# Patient Record
Sex: FEMALE | Race: BLACK OR AFRICAN AMERICAN | Employment: UNEMPLOYED | URBAN - METROPOLITAN AREA
[De-identification: names, ages, dates, MRNs, and addresses within clinical notes are randomized per-mention and may not be internally consistent; named-entity substitution may affect disease eponyms.]

---

## 2017-11-03 ENCOUNTER — APPOINTMENT (OUTPATIENT)
Dept: GENERAL RADIOLOGY | Age: 27
End: 2017-11-03
Attending: PHYSICIAN ASSISTANT
Payer: COMMERCIAL

## 2017-11-03 ENCOUNTER — HOSPITAL ENCOUNTER (EMERGENCY)
Age: 27
Discharge: HOME | End: 2017-11-03
Attending: EMERGENCY MEDICINE
Payer: COMMERCIAL

## 2017-11-03 VITALS
BODY MASS INDEX: 39.99 KG/M2 | RESPIRATION RATE: 16 BRPM | SYSTOLIC BLOOD PRESSURE: 145 MMHG | WEIGHT: 240 LBS | HEART RATE: 79 BPM | OXYGEN SATURATION: 97 % | HEIGHT: 65 IN | DIASTOLIC BLOOD PRESSURE: 78 MMHG | TEMPERATURE: 97.9 F

## 2017-11-03 DIAGNOSIS — R29.898 ANKLE WEAKNESS: Primary | ICD-10-CM

## 2017-11-03 PROCEDURE — 99282 EMERGENCY DEPT VISIT SF MDM: CPT

## 2017-11-03 PROCEDURE — 73610 X-RAY EXAM OF ANKLE: CPT

## 2017-11-03 NOTE — ED PROVIDER NOTES
HPI Comments: 12:25 PM Presley May is a 28 y.o. female who presents to the ED c/o left ankle muscle weakness that began \"a couple of days ago. \" The patient states that she cannot walk on her ankle d/t it feeling weak. She reports left ORIF 4 years ago and did receive physical therapy afterwards, but does report walking with a limp since the injury. The patient denies any ankle pain. She also denies recent left ankle injury, falls, and additional complaints or concerns. The history is provided by the patient. Past Medical History:   Diagnosis Date    Asthma     Sleep apnea        Past Surgical History:   Procedure Laterality Date    HX ORTHOPAEDIC Left 2012    ankle    HX WISDOM TEETH EXTRACTION  2011         Family History:   Problem Relation Age of Onset    Diabetes Mother     Diabetes Father     Diabetes Maternal Grandmother        Social History     Social History    Marital status: SINGLE     Spouse name: N/A    Number of children: N/A    Years of education: N/A     Occupational History    Not on file. Social History Main Topics    Smoking status: Never Smoker    Smokeless tobacco: Never Used    Alcohol use Yes      Comment: OCCASIONALLY    Drug use: No    Sexual activity: Not on file     Other Topics Concern    Not on file     Social History Narrative    No narrative on file         ALLERGIES: Review of patient's allergies indicates no known allergies. Review of Systems   Constitutional: Negative for diaphoresis and fever. HENT: Negative for ear pain, rhinorrhea and trouble swallowing. Eyes: Negative for visual disturbance. Respiratory: Negative for cough and shortness of breath. Cardiovascular: Negative for chest pain and leg swelling. Gastrointestinal: Negative for abdominal pain, blood in stool, diarrhea, nausea and vomiting. Genitourinary: Negative for difficulty urinating, flank pain and hematuria.    Musculoskeletal: Negative for back pain and neck pain.   Skin: Negative for rash. Neurological: Positive for weakness (left ankle). Negative for dizziness, numbness and headaches. Hematological: Negative. Psychiatric/Behavioral: Negative. All other systems reviewed and are negative. Vitals:    11/03/17 1222   BP: 145/78   Pulse: 79   Resp: 16   Temp: 97.9 °F (36.6 °C)   SpO2: 97%   Weight: 108.9 kg (240 lb)   Height: 5' 5\" (1.651 m)            Physical Exam   Constitutional: She is oriented to person, place, and time. She appears well-developed and well-nourished. No distress. HENT:   Head: Normocephalic and atraumatic. Eyes: Conjunctivae are normal.   Neck: Normal range of motion. Neck supple. Cardiovascular: Normal rate, regular rhythm and normal heart sounds. Pulmonary/Chest: Effort normal and breath sounds normal. No respiratory distress. She has no wheezes. She has no rales. Musculoskeletal: Normal range of motion. Left ankle with well healed surgical scars. Flexion, extension, inversion and eversion tested, 5/5 through all planes of motion. Knee strength 5/5. Patellar reflex 2+. No swelling, erythema or calf tenderness. Neurological: She is alert and oriented to person, place, and time. Skin: Skin is warm and dry. Psychiatric: She has a normal mood and affect. Her behavior is normal. Judgment and thought content normal.   Nursing note and vitals reviewed. MDM  Number of Diagnoses or Management Options  Ankle weakness:     ED Course       SplintMuriel  Date/Time: 11/3/2017 1:01 PM  Performed by: Kym Pardees  Authorized by: Kym Paredes     Consent:     Consent obtained:  Verbal    Consent given by:  Patient    Risks discussed:  Numbness and pain    Alternatives discussed:  No treatment  Pre-procedure details:     Sensation:  Normal  Procedure details:     Laterality:  Left    Location:  Ankle    Ankle:  L ankle    Strapping: no      Splint type:   Ankle stirrup    Supplies:  Prefabricated splint  Post-procedure details:     Pain:  Improved    Sensation:  Normal    Patient tolerance of procedure: Tolerated well, no immediate complications        Vitals:  Patient Vitals for the past 12 hrs:   Temp Pulse Resp BP SpO2   11/03/17 1222 97.9 °F (36.6 °C) 79 16 145/78 97 %         Medications ordered:   Medications - No data to display        X-Ray, CT or other radiology findings or impressions:  XR ANKLE LT MIN 3 V   Final Result   IMPRESSION:   1. Open reduction internal fixation of the distal tibia and fibula  with   evidence of healing. 2.  No evidence of an acute fracture or dislocation. 3.  Soft tissue edema. 1:03 PM Pt well appearing and in NAD. Nothing acute on x-ray. No obvious weakness noted on exam. Will give ankle splint for support, refer to ortho for further eval and possible PT referral.     Disposition:  Diagnosis:   1. Ankle weakness        Disposition: Discharge     Follow-up Information     Follow up With Details Comments [address] [place] [address] [facility] [place] [phone] Call To make a follow up appointment [address] [facility], [address] [place] [id number] [facility] EMERGENCY DEPT  Immediately if symptoms worsen [address] [place] [id number]  [phone]           Patient's Medications   Start Taking    No medications on file   Continue Taking    ALBUTEROL (VENTOLIN HFA) 90 MCG/ACTUATION INHALER    Take 2 puffs by inhalation every four (4) hours as needed. ASPIRIN (ASPIRIN) 325 MG TABLET    Take 1 Tab by mouth daily. FLUTICASONE (FLONASE) 50 MCG/ACTUATION NASAL SPRAY    One spray per nostril nightly    GUAIFENESIN-CODEINE (CHERATUSSIN AC)  MG/5 ML SOLUTION    Take 5 mL by mouth four (4) times daily as needed for Cough.     METHYLPREDNISOLONE (MEDROL, TG,) 4 MG TABLET    Per dose pack instructions   These Medications have changed    No medications on file   Stop Taking    No medications on file         Tabatha Ayah acting as a scribe for and in the presence of Demetrio Velazquez      November 03, 2017 at 12:30 PM       Provider Attestation:      I personally performed the services described in the documentation, reviewed the documentation, as recorded by the scribe in my presence, and it accurately and completely records my words and actions.  November 03, 2017 at 12:30 PM - Rodney Arteaga PA-C

## 2017-11-03 NOTE — ED NOTES
I have reviewed discharge instructions with the patient. The patient verbalized understanding. Patient armband removed and shredded  Pt left ED in a WC, alert and in NAD.

## 2017-11-03 NOTE — DISCHARGE INSTRUCTIONS
Muscle Conditioning: Exercises  Your Care Instructions  Here are some examples of exercises for muscle conditioning. Start each exercise slowly. Ease off the exercise if you start to have pain. Your doctor or physical therapist will tell you when you can start these exercises and which ones will work best for you. How to do the exercises  Wall push-ups    When you can do this exercise against a wall comfortably (without your muscles feeling tired), you can try it against a counter. Start with 5 repetitions again and work up to 8 to 12. You can then slowly progress to the end of a couch or a sturdy chair, and finally to the floor. 1. Stand facing a wall, about 12 to 18 inches away. 2. Place your hands on the wall at shoulder height. 3. Slowly bend your elbows and bring your face toward the wall, moving your hips and shoulders forward together. 4. Push slowly back to the starting position. 5. Start with 5 repetitions and work up to 8 to 12. 6. Rest for a minute, and repeat the exercise. Knee extension    If this exercise becomes easy, you can add a light weight around your ankle or tie an elastic resistance band to a chair leg and one ankle. 1. While sitting in a chair, straighten one leg and hold while you slowly count to 5. Be sure you do not lock your knee. 2. Repeat 8 to 12 times. 3. Rest for a minute, and repeat the exercise. 4. Do the same exercise with the other leg. Side-lying leg lift    If this exercise becomes easy, you can add a light weight around your ankle or tie an elastic resistance band to both ankles. 1. Lie on your side, with your legs extended. Keep your hips straight up and down during this exercise. Do not let your top hip rock toward the back. Support your head with your hand, and place the other hand on the floor near your waist.  2. Slowly raise your upper leg until it is about in line with your shoulder. Keep your toes pointed forward.   3. Slowly lower your leg to the starting position. 4. Repeat 8 to 12 times. 5. Rest for a minute, and repeat the exercise. 6. Turn to your other side and do the same exercise with your other leg. Shallow standing knee bends    1. Stand with your hands lightly resting on a counter or chair in front of you with your feet shoulder-width apart. 2. Slowly bend your knees so that you squat down just like you were going to sit in a chair. Make sure your knees do not go in front of your toes. 3. Lower yourself about 6 inches. Your heels should remain on the floor at all times. 4. Rise slowly to a standing position. 5. Repeat 8 to 12 times. 6. Rest for a minute, and repeat the exercise. Follow-up care is a key part of your treatment and safety. Be sure to make and go to all appointments, and call your doctor if you are having problems. It's also a good idea to know your test results and keep a list of the medicines you take. Where can you learn more? Go to http://josh-rosmery.info/. Enter C063 in the search box to learn more about \"Muscle Conditioning: Exercises. \"  Current as of: March 13, 2017  Content Version: 11.4  © 2169-5736 Healthwise, [employer]. Care instructions adapted under license by [employer] (which disclaims liability or warranty for this information). If you have questions about a medical condition or this instruction, always ask your healthcare professional. [place] [address] any warranty or liability for your use of this information.

## 2017-11-13 ENCOUNTER — OFFICE VISIT (OUTPATIENT)
Dept: ORTHOPEDIC SURGERY | Age: 27
End: 2017-11-13

## 2017-11-13 VITALS
TEMPERATURE: 95.6 F | OXYGEN SATURATION: 98 % | DIASTOLIC BLOOD PRESSURE: 58 MMHG | WEIGHT: 240 LBS | BODY MASS INDEX: 39.99 KG/M2 | HEART RATE: 76 BPM | SYSTOLIC BLOOD PRESSURE: 101 MMHG | HEIGHT: 65 IN

## 2017-11-13 DIAGNOSIS — R29.898 WEAKNESS OF BOTH LOWER EXTREMITIES: Primary | ICD-10-CM

## 2017-11-13 DIAGNOSIS — M19.172 POST-TRAUMATIC OSTEOARTHRITIS OF LEFT ANKLE: ICD-10-CM

## 2017-11-13 NOTE — PROGRESS NOTES
AMBULATORY PROGRESS NOTE      Patient: Jermaine Bey             MRN: [id number]     SSN: [id number] Body mass index is 39.94 kg/(m^2). YOB: 1990     AGE: 28 y.o. EX: female    PCP: None    IMPRESSION/DIAGNOSIS AND TREATMENT PLAN     DIAGNOSES  1. Weakness of both lower extremities    2. Post-traumatic osteoarthritis of left ankle        Orders Placed This Encounter    AMB SUPPLY ORDER    NCV/LAT MOTOR PER NERVE LOW/LT    NCV/LAT MOTOR PER NERVE LOW/RT      Jermaine Bey understands her diagnoses and the proposed plan. Plan:    1) DME Order: Hinged AFO Brace  2) EMG/NCS Studies Bilateral    RTO - After EMG : need to assess her DPN. HPI AND EXAMINATION     Jermaine Bey IS A 28 y.o. female who presents to my outpatient office complaining of left ankle pain. The patient reports that she sprained her left ankle and had a surgery done by Dr. Rohini Knight. She notes pain is elicited after prolonged ambulation. Pt states that she says that she always had a limp while ambulating and can fall occasionally. Pt denies h/o CVA. Jermaine Bey is alert/oriented (name, location, time) and follows commands well. she  is in no acute distress and her affect and mood are appropriate. Left ANKLE and FOOT       Gait: slow  Tenderness: no TTP. Cutaneous: No rashes, skin patches, wounds, or abrasions to the lower legs           Warm and Normal color. No regions of expressible drainage. Medial Border of Tibia Region: absent           Skin color, texture, turgor normal. Normal.           Well healed lateral and medial surgical scars. Joint Motion: ROM Ankle:Decreased , Hindfoot: (ST,TN,CC Decreased}, Forefoot toes:Normal  Neurologic Exam: Neuro: Motor: weakness noted: Weakness to EHL, EDC. Weakness to Eversion, Dorsiflexion 2/5 and Sensory : no sensory deficits noted.     Contractures: Gastrocnemius or Achilles Contractures moderate  Joint / Tendon Stability: No Ankle or Subtalar instability or joint laxity. No peroneal sublux ability or dislocation  Alignment:  Normal Foot Alignment and  Flexible  Vascular: Normal Pulses/ NL Capillary refill, No evidence of DVT seen on physical exam.   No calf swelling, no tenderness to calf muscles. Lymphatic:  No Evidence of Lymphedema. CHART REVIEW     Past Medical History:   Diagnosis Date    Asthma     Sleep apnea      Current Outpatient Prescriptions   Medication Sig    albuterol (VENTOLIN HFA) 90 mcg/actuation inhaler Take 2 puffs by inhalation every four (4) hours as needed.  methylprednisolone (MEDROL, TG,) 4 mg tablet Per dose pack instructions    fluticasone (FLONASE) 50 mcg/actuation nasal spray One spray per nostril nightly    guaiFENesin-codeine (CHERATUSSIN AC)  mg/5 mL solution Take 5 mL by mouth four (4) times daily as needed for Cough.  aspirin (ASPIRIN) 325 mg tablet Take 1 Tab by mouth daily. No current facility-administered medications for this visit. No Known Allergies  Past Surgical History:   Procedure Laterality Date    HX ORTHOPAEDIC Left 2012    ankle    HX WISDOM TEETH EXTRACTION  2011     Social History     Occupational History    Not on file. Social History Main Topics    Smoking status: Never Smoker    Smokeless tobacco: Never Used    Alcohol use Yes      Comment: OCCASIONALLY    Drug use: No    Sexual activity: Not on file     Family History   Problem Relation Age of Onset    Diabetes Mother     Diabetes Father     Diabetes Maternal Grandmother        REVIEW OF SYSTEMS : 11/13/2017  ALL BELOW ARE Negative except : SEE HPI       Constitutional: Negative for fever, chills and weight loss. Neg Weigh Loss  Cardiovascular: Negative for chest pain, claudication and leg swelling.  SOB, KEN   Gastrointestinal: Negative for  pain, N/V/D/C, Blood in stool or urine,dysuria, hematuria,        Incontinence, pelvic pain  Musculoskeletal: see HPI.  Neurological: Negative for dizziness and weakness. Negative for headaches,Visual Changes, Confusion, Seizures,   Psychiatric/Behavioral: Negative for depression, memory loss and substance abuse. Extremities:  Negative for  hair changes, rash or skin lesion changes. Hematologic: Negative for Bleeding problems, bruising, pallor or swollen lymph nodes. Peripheral Vascular: No calf pain, vascular vein tenderness to calf pain              No calf throbbing, posterior knee throbbing pain    DIAGNOSTIC IMAGING     No notes on file    XR Results (most recent):    Results from Hospital Encounter encounter on 11/03/17   XR ANKLE LT MIN 3 V   Narrative EXAM: Left ankle X-Ray    INDICATION: Left ankle pain. TECHNIQUE: 3 views of the left ankle    COMPARISON: 12/14/2012 and 12/9/2012    FINDINGS:   Side plate with multiple screws are noted in the distal tibia and lateral  malleolus. No interval hardware complications appreciated. There is adequate  alignment and apposition of the fracture fragments. Changes consistent with  progression of healing are noted. No evidence of an acute fracture or  dislocation. Soft tissue edema is noted. Impression IMPRESSION:  1. Open reduction internal fixation of the distal tibia and fibula  with  evidence of healing. 2.  No evidence of an acute fracture or dislocation. 3.  Soft tissue edema. Written by Teresa Conway, as dictated by Emilie Scott MD. IDr., Emilie Scott MD, confirm that all documentation is accurate.

## 2017-11-13 NOTE — MR AVS SNAPSHOT
Visit Information Date & Time Provider Department Dept. Phone Encounter #  
 11/13/2017  3:30 PM Aurelio Zepeda, [address] [facility] [phone] Upcoming Health Maintenance Date Due DTaP/Tdap/Td series (1 - Tdap) 4/24/2010 PAP AKA CERVICAL CYTOLOGY 9/24/2015 Influenza Age 5 to Adult 8/1/2017 Allergies as of 11/13/2017  Review Complete On: 11/13/2017 By: Shlomo Duverney Strowder No Known Allergies Current Immunizations  Never Reviewed No immunizations on file. Not reviewed this visit You Were Diagnosed With   
  
 Codes Comments Weakness of both lower extremities    -  Primary ICD-10-CM: R29.898 ICD-9-CM: 729.89 Vitals BP Pulse Temp Height(growth percentile) Weight(growth percentile) SpO2  
 101/58 76 95.6 °F (35.3 °C) (Oral) 5' 5\" (1.651 m) 240 lb (108.9 kg) 98% BMI OB Status Smoking Status 39.94 kg/m2 Having regular periods Never Smoker BMI and BSA Data Body Mass Index Body Surface Area  
 39.94 kg/m 2 2.23 m 2 Your Updated Medication List  
  
   
This list is accurate as of: 11/13/17  5:28 PM.  Always use your most recent med list.  
  
  
  
  
 albuterol 90 mcg/actuation inhaler Commonly known as:  VENTOLIN HFA Take 2 puffs by inhalation every four (4) hours as needed. aspirin 325 mg tablet Commonly known as:  ASPIRIN Take 1 Tab by mouth daily. fluticasone 50 mcg/actuation nasal spray Commonly known as:  Neli Jumper One spray per nostril nightly  
  
 guaiFENesin-codeine 100-10 mg/5 mL solution Commonly known as:  Magnolia Jose Take 5 mL by mouth four (4) times daily as needed for Cough. methylPREDNISolone 4 mg tablet Commonly known as:  MEDROL (TG) Per dose pack instructions We Performed the Following AMB SUPPLY ORDER [[id number] Custom] Comments:  
 Hinged AFO brace for left side To-Do List   
 11/13/2017 Neurology:  NCV/LAT MOTOR PER NERVE LOW/LT   
  
 11/13/2017 Neurology:  NCV/LAT MOTOR PER NERVE LOW/RT Referral Information Referral ID Referred By Referred To  
  
 [id number] Amalia Ruelas Not Available Visits Status Start Date End Date 1 New Request 11/13/17 11/13/18 If your referral has a status of pending review or denied, additional information will be sent to support the outcome of this decision. Referral ID Referred By Referred To  
 [id number] Amalia Ruelas Not Available Visits Status Start Date End Date 1 New Request 11/13/17 11/13/18 If your referral has a status of pending review or denied, additional information will be sent to support the outcome of this decision. Patient Instructions Please follow up after EMG. You are advised to contact us if your condition worsens. Foot Pain: Care Instructions Your Care Instructions Foot injuries that cause pain and swelling are fairly common. Almost all sports or home repair projects can cause a misstep that ends up as foot pain. Normal wear and tear, especially as you get older, also can cause foot pain. Most minor foot injuries will heal on their own, and home treatment is usually all you need to do. If you have a severe injury, you may need tests and treatment. Follow-up care is a key part of your treatment and safety. Be sure to make and go to all appointments, and call your doctor if you are having problems. It's also a good idea to know your test results and keep a list of the medicines you take. How can you care for yourself at home? · Take pain medicines exactly as directed. ¨ If the doctor gave you a prescription medicine for pain, take it as prescribed. ¨ If you are not taking a prescription pain medicine, ask your doctor if you can take an over-the-counter medicine. · Rest and protect your foot. Take a break from any activity that may cause pain. · Put ice or a cold pack on your foot for 10 to 20 minutes at a time. Put a thin cloth between the ice and your skin. · Prop up the sore foot on a pillow when you ice it or anytime you sit or lie down during the next 3 days. Try to keep it above the level of your heart. This will help reduce swelling. · Your doctor may recommend that you wrap your foot with an elastic bandage. Keep your foot wrapped for as long as your doctor advises. · If your doctor recommends crutches, use them as directed. · Wear roomy footwear. · As soon as pain and swelling end, begin gentle exercises of your foot. Your doctor can tell you which exercises will help. When should you call for help? Call 911 anytime you think you may need emergency care. For example, call if: 
? · Your foot turns pale, white, blue, or cold. ?Call your doctor now or seek immediate medical care if: 
? · You cannot move or stand on your foot. ? · Your foot looks twisted or out of its normal position. ? · Your foot is not stable when you step down. ? · You have signs of infection, such as: 
¨ Increased pain, swelling, warmth, or redness. ¨ Red streaks leading from the sore area. ¨ Pus draining from a place on your foot. ¨ A fever. ? · Your foot is numb or tingly. ? Watch closely for changes in your health, and be sure to contact your doctor if: 
? · You do not get better as expected. ? · You have bruises from an injury that last longer than 2 weeks. Where can you learn more? Go to http://josh-rosmery.info/. Enter Q053 in the search box to learn more about \"Foot Pain: Care Instructions. \" Current as of: March 21, 2017 Content Version: 11.4 © 8036-6400 F[employer]. Care instructions adapted under license by [employer] (which disclaims liability or warranty for this information).  If you have questions about a medical condition or this instruction, always ask your healthcare professional. [address]  any warranty or liability for your use of this information. Introducing [facility]! [facility] introduces OP3Nvoice patient portal. Now you can access parts of your medical record, email your doctor's office, and request medication refills online. 1. In your internet browser, go to [email or web address] 2. Click on the First Time User? Click Here link in the Sign In box. You will see the New Member Sign Up page. 3. Enter your OP3Nvoice Access Code exactly as it appears below. You will not need to use this code after youve completed the sign-up process. If you do not sign up before the expiration date, you must request a new code. · OP3Nvoice Access Code: [email or web address] Expires: 12/31/2017  6:16 PM 
 
4. Enter the last four digits of your Social Security Number (xxxx) and Date of Birth (mm/dd/yyyy) as indicated and click Submit. You will be taken to the next sign-up page. 5. Create a OP3Nvoice ID. This will be your OP3Nvoice login ID and cannot be changed, so think of one that is secure and easy to remember. 6. Create a OP3Nvoice password. You can change your password at any time. 7. Enter your Password Reset Question and Answer. This can be used at a later time if you forget your password. 8. Enter your e-mail address. You will receive e-mail notification when new information is available in [address]. 9. Click Sign Up. You can now view and download portions of your medical record. 10. Click the Download Summary menu link to download a portable copy of your medical information. If you have questions, please visit the Frequently Asked Questions section of the OP3Nvoice website. Remember, OP3Nvoice is NOT to be used for urgent needs. For medical emergencies, dial 911. Now available from your iPhone and Android! Please provide this summary of care documentation to your next provider. Your primary care clinician is listed as NONE. If you have any questions after today's visit, please call [phone].

## 2017-11-15 ENCOUNTER — DOCUMENTATION ONLY (OUTPATIENT)
Dept: ORTHOPEDIC SURGERY | Age: 27
End: 2017-11-15

## 2017-12-06 ENCOUNTER — TELEPHONE (OUTPATIENT)
Dept: ORTHOPEDIC SURGERY | Age: 27
End: 2017-12-06

## 2017-12-06 NOTE — TELEPHONE ENCOUNTER
PATIENT IS CHECKING THE STATUS OF HER EMG THAT WAS ORDERED AT HER VISIT ON 11/13/17.     SHE LIVES IN [place] AND WOULD LIKE SOMETHING IN CLOSE TO HER

## 2017-12-07 NOTE — TELEPHONE ENCOUNTER
Patient called again regarding this. She also says Dr. Gilford Jesus referred her to another doctor in neurology and she is wondering who that doctor is. Please advise patient at [phone].

## 2017-12-08 NOTE — TELEPHONE ENCOUNTER
Tried to call patient & let her know that her emg order was faxed to [facility] & I was going to give her the phone number to call & schedule [phone] but her LM was full

## 2017-12-11 NOTE — TELEPHONE ENCOUNTER
Patient called back because she missed a call from us and she had been calling checking on the EMG that Dr. Lan Anaya ordered. I relayed the below message to her and gave her [facility]'s number as listed. Patient verbalized understanding.

## 2018-01-24 ENCOUNTER — HOSPITAL ENCOUNTER (EMERGENCY)
Age: 28
Discharge: HOME | End: 2018-01-24
Attending: EMERGENCY MEDICINE
Payer: COMMERCIAL

## 2018-01-24 VITALS
DIASTOLIC BLOOD PRESSURE: 67 MMHG | SYSTOLIC BLOOD PRESSURE: 104 MMHG | RESPIRATION RATE: 18 BRPM | HEART RATE: 69 BPM | BODY MASS INDEX: 38.94 KG/M2 | TEMPERATURE: 97.5 F | OXYGEN SATURATION: 100 % | WEIGHT: 234 LBS
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
## 2018-01-24 PROCEDURE — 74011250636 HC RX REV CODE- 250/636: Performed by: EMERGENCY MEDICINE

## 2018-01-24 PROCEDURE — 74011250637 HC RX REV CODE- 250/637: Performed by: EMERGENCY MEDICINE

## 2018-01-24 PROCEDURE — 99284 EMERGENCY DEPT VISIT MOD MDM: CPT

## 2018-01-24 PROCEDURE — 96372 THER/PROPH/DIAG INJ SC/IM: CPT

## 2018-01-24 RX ORDER — KETOROLAC TROMETHAMINE 30 MG/ML
60 INJECTION, SOLUTION INTRAMUSCULAR; INTRAVENOUS
Status: COMPLETED | OUTPATIENT
Start: 2018-01-24 | End: 2018-01-24

## 2018-01-24 RX ORDER — CYCLOBENZAPRINE HCL 5 MG
10 TABLET ORAL
Qty: 9 TAB | Refills: 0 | Status: SHIPPED | OUTPATIENT
Start: 2018-01-24

## 2018-01-24 RX ORDER — NAPROXEN 500 MG/1
500 TABLET ORAL 2 TIMES DAILY WITH MEALS
Qty: 20 TAB | Refills: 0 | Status: SHIPPED | OUTPATIENT
Start: 2018-01-24

## 2018-01-24 RX ORDER — CYCLOBENZAPRINE HCL 10 MG
10 TABLET ORAL
Status: COMPLETED | OUTPATIENT
Start: 2018-01-24 | End: 2018-01-24

## 2018-01-24 RX ADMIN — KETOROLAC TROMETHAMINE 60 MG: 30 INJECTION, SOLUTION INTRAMUSCULAR at 09:29

## 2018-01-24 RX ADMIN — CYCLOBENZAPRINE HYDROCHLORIDE 10 MG: 10 TABLET, FILM COATED ORAL at 09:29
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
## 2018-02-01 ENCOUNTER — HOSPITAL ENCOUNTER (OUTPATIENT)
Dept: MRI IMAGING | Age: 28
Discharge: HOME | End: 2018-02-01
Attending: PSYCHIATRY & NEUROLOGY
Payer: COMMERCIAL

## 2018-02-01 DIAGNOSIS — G35 MULTIPLE SCLEROSIS (HCC): ICD-10-CM

## 2018-02-01 PROCEDURE — 70551 MRI BRAIN STEM W/O DYE: CPT

## 2018-02-02 ENCOUNTER — HOSPITAL ENCOUNTER (OUTPATIENT)
Dept: LAB | Age: 28
Discharge: HOME | End: 2018-02-02

## 2018-02-02 LAB — SENTARA SPECIMEN COL,SENBCF: NORMAL

## 2018-02-02 PROCEDURE — 99001 SPECIMEN HANDLING PT-LAB: CPT | Performed by: PSYCHIATRY & NEUROLOGY

## 2018-03-07 ENCOUNTER — HOSPITAL ENCOUNTER (OUTPATIENT)
Dept: MRI IMAGING | Age: 28
Discharge: HOME | End: 2018-03-07
Attending: PSYCHIATRY & NEUROLOGY
Payer: COMMERCIAL

## 2018-03-07 DIAGNOSIS — G35 MULTIPLE SCLEROSIS (HCC): ICD-10-CM

## 2018-03-07 PROCEDURE — 72146 MRI CHEST SPINE W/O DYE: CPT

## 2018-03-07 PROCEDURE — 72141 MRI NECK SPINE W/O DYE: CPT

## 2019-07-26 ENCOUNTER — HOSPITAL ENCOUNTER (OUTPATIENT)
Dept: LAB | Age: 29
Discharge: HOME | End: 2019-07-26

## 2019-07-26 LAB — SENTARA SPECIMEN COL,SENBCF: NORMAL

## 2019-07-26 PROCEDURE — 99001 SPECIMEN HANDLING PT-LAB: CPT

## 2019-09-18 ENCOUNTER — HOSPITAL ENCOUNTER (OUTPATIENT)
Dept: PHYSICAL THERAPY | Age: 29
Discharge: HOME | End: 2019-09-18
Payer: MEDICAID

## 2019-09-18 PROCEDURE — 97162 PT EVAL MOD COMPLEX 30 MIN: CPT

## 2019-09-18 PROCEDURE — 97166 OT EVAL MOD COMPLEX 45 MIN: CPT

## 2019-09-18 PROCEDURE — 97530 THERAPEUTIC ACTIVITIES: CPT
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
## 2019-09-18 NOTE — PROGRESS NOTES
[facility] - [facility]  [address] [facility]  [phone] fax    Plan of Care/ Statement of Necessity for Physical Therapy Services    Patient name: Senia Bustillos Start of Care: 2019   Referral source: Garo Conway NP : 1990    Medical Diagnosis: Multiple sclerosis [G35]  Payor: Ben Kemp / Plan: [id number] myContactCard / Product Type: Managed Care Medicaid /  Onset Date:2018    Treatment Diagnosis: gait and balance impairment   Prior Hospitalization: see medical history Provider#: [id number]   Medications: Verified on Patient summary List    Comorbidities: MS, asthma    Prior Level of Function: functionally independent,      The Plan of Care and following information is based on the information from the initial evaluation. Assessment/ key information: Patient is a 26year-old female with MS who presents with decreased strength, impaired balance, and difficulty with ambulation. Pt was diagnosed with MS in 2018; she had an MRI about 2 months ago and was given news that the disease progressed. She was using a cane initially, but is now using RW. Pt has a shower chair and raised toilet seat, which has improved her independence/safety with self-care tasks. She reports increased fatigue, can stand for up to 10 minutes at a time, for example when cooking. Pt completes TUG in 34 seconds. Pt has poor static standing balance and posterior lean/poor eccentric control with sit to stand transfers. Patient will benefit from skilled PT to address strength, ROM, balance, and gait to increase ease/safety of ADL's.      Evaluation Complexity History MEDIUM  Complexity : 1-2 comorbidities / personal factors will impact the outcome/ POC ; Examination MEDIUM Complexity : 3 Standardized tests and measures addressing body structure, function, activity limitation and / or participation in recreation  ;Presentation MEDIUM Complexity : Evolving with changing characteristics  ; Clinical Decision Making MEDIUM Complexity : FOTO score of 26-74  Overall Complexity Rating: MEDIUM  Problem List: decrease ROM, decrease strength, impaired gait/ balance, decrease ADL/ functional abilitiies, decrease activity tolerance, decrease flexibility/ joint mobility and decrease transfer abilities   Treatment Plan may include any combination of the following: Therapeutic exercise, Therapeutic activities, Neuromuscular re-education, Physical agent/modality, Gait/balance training, Manual therapy, Patient education, Functional mobility training, Home safety training and Stair training  Patient / Family readiness to learn indicated by: trying to perform skills  Persons(s) to be included in education: patient (P)  Barriers to Learning/Limitations: yes;  sensory deficits-vision/hearing/speech  Patient Goal (s): strengthen  Patient Self Reported Health Status: poor  Rehabilitation Potential: fair to good    Short Term Goals: To be accomplished in 1 weeks:  1. Patient will be compliant with HEP to strength and stability in order to increase ease of transfers. Long Term Goals: To be accomplished in 6 weeks:  1. Patient will complete TUG test in 25 seconds or less to increase ease of household ambulation. 2. Patient will maintain Romberg for 30 seconds without evidence of instability to improve ease of daily activities. 3. Patient will increase FOTO score by 17 pts indicating significant improvement in function and QOL. 4. Patient will perform 5 sit to stands without UE support to demonstrate improved LE stability for ease of transfers. Frequency / Duration: Patient to be seen 2-3 times per week for 6 weeks.     Patient/ CarPatient/ Caregiver education and instruction: Diagnosis, prognosis, exercises   [x]  Plan of care has been reviewed with PTA    Marie Cooper, PT 9/18/2019 10:57 AM    ________________________________________________________________________    I certify that the above Therapy Services are being furnished while the patient is under my care. I agree with the treatment plan and certify that this therapy is necessary.     Physician's Signature:____________Date:_________TIME:________    ** Signature, Date and Time must be completed for valid certification **    Please sign and return to [facility] - [facility]  [address] [facility]  [phone] fax

## 2019-09-18 NOTE — PROGRESS NOTES
PT DAILY TREATMENT NOTE/NEURO EVAL 10-18    Patient Name: Maritza Courser  Date:2019  : 1990  [x]  Patient  Verified  Payor: Lester Loredo / [place] / Product Type: Managed Care Medicaid /    In time:1000  Out time:1044  Total Treatment Time (min): 44  Visit #: 1   Treatment Area: Multiple sclerosis [G35]    SUBJECTIVE  Pain Level (0-10 scale): 0/10  []constant []intermittent []improving []worsening []no change since onset    Any medication changes, allergies to medications, adverse drug reactions, diagnosis change, or new procedure performed?: [x] No    [] Yes (see summary sheet for update)  Subjective functional status/changes:     Patient is a 26year-old female with MS who presents with decreased strength, impaired balance, and difficulty with ambulation. Pt was diagnosed with MS in 2018; she had an MRI about 2 months ago and was given news that the disease progressed. She was using a cane initially, but is now using RW. Pt has a shower chair and raised toilet seat, which has improved her independence/safety with self-care tasks. She reports increased fatigue, can stand for up to 10 minutes at a time, for example when cooking  Living Situation: lives with 8year old daughter, mother around the corner  Pt Goals: \" strengthen\"      OBJECTIVE/EXAMINATION        26 min [x]Eval                  []Re-Eval       18 min Therapeutic Activity:  []  See flow sheet : educated on home safety to reduce falls risk, educated on sit to stand transfer safety, TUG in 34 seconds, 5 times sit to stand with CGA using UE on legs   Rationale: increase ROM, increase strength, improve coordination and improve balance  to improve the patients ability to increase ease of ADL's.                With   [] TE   [] TA   [] neuro   [] other: Patient Education: [x] Review HEP    [] Progressed/Changed HEP based on:   [] positioning   [] body mechanics   [] transfers   [] heat/ice application [] other:      Other Objective/Functional Measures:     Physical Therapy Evaluation - Neurologic      Gait: [] Normal    [x] Abnormal    Device: RW    Describe: flat foot IC decreased push off, slow gissell      Strength (MMT): left : 66.6 right :58.3   Shoulder L (1-5) R (1-5)   Shoulder Flexion 4+/5 4+/5   Shoulder Ext     Shoulder ABD     Shoulder ADD     Shoulder IR     Shoulder ER                                               Hip L (1-5) R (1-5)   Hip Flexion 4-/5 4/5   Hip Ext     Hip ABD     Hip ADD     Hip ER 3+/5 3+/5   Hip IR 3+/5 3+/5     Knee L (1-5) R (1-5)   Knee Flexion 4-/5 4-/5   Knee Extension 4/5 4/5   Ankle PF     Ankle DF 4+/5 5+/5   Other             Reflexes: [] Not Tested   Left Right   Biceps (C5)     Brachioradiais (C6)     Triceps (C7)     Knee Jerk (L4) 2+ 1+   Ankle Jerk (SI)       Balance/ Equilibrium:              Standing Balance: Static:   [] Good    [] Fair    [x] Poor     Dynamic:   [] Good    [] Fair    [x] Poor        Protective Extension:  [] Present    [x] Delayed    [] Absent        Single Leg Stance:         Eyes Open  Eyes Closed   L unable L    R unable R      + Romberg with LOB      Other test /comments:  5 time sit to stand: 29 seconds  TU seconds  Negotiates stairs with B handrails at home       Pain Level (0-10 scale) post treatment: 0/10    ASSESSMENT/Changes in Function: See plan of care. Patient will continue to benefit from skilled PT services to modify and progress therapeutic interventions, address functional mobility deficits, address ROM deficits, address strength deficits, analyze and address soft tissue restrictions, analyze and cue movement patterns, address imbalance/dizziness and instruct in home and community integration to attain remaining goals.      [x]  See Plan of Care  []  See progress note/recertification  []  See Discharge Summary         Progress towards goals / Updated goals:  See POC    PLAN  []  Upgrade activities as tolerated [x]  Continue plan of care  []  Update interventions per flow sheet       []  Discharge due to:_  []  Other:_      Aguilar Figueredo, PT 9/18/2019  10:04 AM

## 2019-10-11 ENCOUNTER — APPOINTMENT (OUTPATIENT)
Dept: PHYSICAL THERAPY | Age: 29
End: 2019-10-11
Payer: MEDICAID

## 2019-10-11 ENCOUNTER — HOSPITAL ENCOUNTER (OUTPATIENT)
Dept: PHYSICAL THERAPY | Age: 29
End: 2019-10-11
Payer: MEDICAID

## 2019-10-18 ENCOUNTER — HOSPITAL ENCOUNTER (OUTPATIENT)
Dept: PHYSICAL THERAPY | Age: 29
Discharge: HOME | End: 2019-10-18
Payer: MEDICAID

## 2019-10-18 PROCEDURE — 97110 THERAPEUTIC EXERCISES: CPT

## 2019-10-18 PROCEDURE — 97530 THERAPEUTIC ACTIVITIES: CPT

## 2019-10-18 PROCEDURE — 97112 NEUROMUSCULAR REEDUCATION: CPT

## 2019-10-18 NOTE — PROGRESS NOTES
OT DAILY TREATMENT NOTE 10-18    Patient Name: Mg Ruiz  Date:10/18/2019  : 1990  [x]  Patient  Verified  Payor: Dot Carrillo / Plan: [facility] MEDICAID / Product Type: Managed Care Medicaid /    In time:7:40  Out time:8:15  Total Treatment Time (min): 35  Visit #: 2 of 12    Treatment Area: Lack of coordination [R27.9]  Multiple sclerosis [G35]    SUBJECTIVE  Pain Level (0-10 scale): 0/10  Any medication changes, allergies to medications, adverse drug reactions, diagnosis change, or new procedure performed?: [x] No    [] Yes (see summary sheet for update)  Subjective functional status/changes:   [] No changes reported  Having trouble with hands when helping daughter with homework    OBJECTIVE    First treatment session since initial eval, see updated goals. 35 min Therapeutic Activity:  []  See flow sheet :   Rationale: improve coordination and increase proprioception  to improve the patients ability to write. Theraputty soft, right hand 10/10  1/4 in pegs for FM coordination and palm to digit/digit to palm translation 35 x  Visual memory worksheet using pen to write, left hand.  6/7 correct     With   [] TE   [x] TA   [] neuro   [] other: Patient Education: [x] Review HEP     [] Progressed/Changed HEP based on:   [] positioning   [] body mechanics   [] transfers   [] heat/ice application   [] Splint wear/care   [] Sensory re-education   [] scar management      [x] other: Reviewed short-term memory strategies - taking notes while doing multiple tasks at home           Other Objective/Functional Measures:   6/7 questions correct during visual memory activity    Pain Level (0-10 scale) post treatment: 0/10     ASSESSMENT/Changes in Function: Patient improving focus during divided attention activity     Patient will continue to benefit from skilled OT services to modify and progress therapeutic interventions, address ROM deficits, analyze and address soft tissue restrictions, analyze and cue movement patterns and instruct in home and community integration to attain remaining goals. [x]  See Plan of Care  []  See progress note/recertification  []  See Discharge Summary         Progress towards goals / Updated goals:  Short Term Goals: To be accomplished in 2 weeks:  1. Patient will be independent in HEP for fine motor skills ot improve writing. 2.  Patient will be familiar with adaptive strategies to remember tasks to be performed. Status at progress note: Started with discussing using notes at home 10/18/19  3. Patient will be able to accurately attend to divided attention tasks without loss of focus for 15 minutes. Status at progress note: progressing with visual memory tasks 10/18/19     Long Term Goals: To be accomplished in 4 weeks:          1. Patient will be able to manage personal and family obligations without reminders form others. Status at progress note: Progressing with recall strategies 10/18/19  2. Patient will use strategies to improve recalll of informations without cueing  Status at progress note: progressing with visual memory tasks 10/18/19  3.   Patient will increase fine motor skills in both  hand by 30% to ease fasteners  Status at progress note: Progressing with fine motor coordination tasks 10/18/19       PLAN  []  Upgrade activities as tolerated     [x]  Continue plan of care  []  Update interventions per flow sheet       []  Discharge due to:_  []  Other:_      Dianne Dakins, OTS 10/18/2019  7:40 AM    Future Appointments   Date Time Provider Brady Bates   10/18/2019  8:30 AM Cali Lobo PTA [facility] [address]   10/21/2019  4:00 PM Barbie Burnham PTA [facility] [address]   10/21/2019  4:45 PM Kacy Mcdaniel EUYSOKN [address]   10/24/2019  3:00 PM Melly Bobby, PT [id number] [address]   10/24/2019  3:30 PM Fabiana Pizarro OTR/L [facility] [address]   10/29/2019  3:00 PM Melly Bobby, PT HAYAHYW [address]   10/29/2019  3:30 PM Kacy Mcdaniel ADXNURJ [address]   10/31/2019 11:30 AM Viktoria Balderas, MARI [facility]   10/31/2019 12:00 PM Gabbie Bills, OTR/L [facility]

## 2019-10-18 NOTE — PROGRESS NOTES
PT DAILY TREATMENT NOTE 10-18    Patient Name: Domingo Bill  Date:10/18/2019  : 1990  [x]  Patient  Verified  Payor: Felicitas Matt / [place] / Product Type: Managed Care Medicaid /    In time:8:23  Out time:8:58  Total Treatment Time (min): 35  Visit #: 2 of       Treatment Area: Gait instability [R26.81]  Balance disorder [R26.89]    SUBJECTIVE  Pain Level (0-10 scale): 0  Any medication changes, allergies to medications, adverse drug reactions, diagnosis change, or new procedure performed?: [x] No    [] Yes (see summary sheet for update)  Subjective functional status/changes:   [] No changes reported  Pt reports feeling fine. States not being able to come since Eval on 19 due to scheduling difficulties. OBJECTIVE    25 min Therapeutic Exercise:  [x] See flow sheet :   Rationale: increase ROM and increase strength to improve the patients ability to perform ADLs    10 min Neuromuscular Re-education:  [x]  See flow sheet :   Rationale: increase strength, improve coordination, improve balance and increase proprioception  to improve the patients ability to perform functional tasks         With   [] TE   [] TA   [] neuro   [] other: Patient Education: [x] Review HEP    [] Progressed/Changed HEP based on:   [] positioning   [] body mechanics   [] transfers   [] heat/ice application    [] other:      Other Objective/Functional Measures:   First f/u after Eval  Right foot strap with bike  TUG 35 sec  FOTO 34  Sit to stand x 5 38 seconds with david UE surrport  Rhomb at least 1 UE support     Pain Level (0-10 scale) post treatment: 0    ASSESSMENT/Changes in Function: Pt has had 1 month lapse in treatment following Eval on 19 due to scheduling difficulties. Pt reports compliance with HEP and is now able to attend PT regularly. Cont progressing towards goals set at Eval to improve ease with transfers, activity tolerance, improve safety with amb.      Patient will continue to benefit from skilled PT services to modify and progress therapeutic interventions, address functional mobility deficits, address ROM deficits, address strength deficits, analyze and address soft tissue restrictions, analyze and cue movement patterns, analyze and modify body mechanics/ergonomics and address imbalance/dizziness to attain remaining goals. []  See Plan of Care  []  See progress note/recertification  []  See Discharge Summary         Progress towards goals / Updated goals:  Short Term Goals: To be accomplished in 1 weeks:  1. Patient will be compliant with HEP to strength and stability in order to increase ease of transfers. Met- pt reports compliance 10/18/19  Long Term Goals: To be accomplished in 6 weeks:  1. Patient will complete TUG test in 25 seconds or less to increase ease of household ambulation. Not met- TUG 35 sec 10/18/19  2. Patient will maintain Romberg for 30 seconds without evidence of instability to improve ease of daily activities. Progressing- Performed Rhomb with 1 UE support and instable 10/18/19  3. Patient will increase FOTO score by 17 pts indicating significant improvement in function and QOL. Progressing- FOTO 34 10/18/19  4. Patient will perform 5 sit to stands without UE support to demonstrate improved LE stability for ease of transfers.     Progressing- Performed sit to stand x 5 with bilateral UE support, poor ecc control in 38 seconds    PLAN  []  Upgrade activities as tolerated     [x]  Continue plan of care  []  Update interventions per flow sheet       []  Discharge due to:_  []  Other:_      Stephane Bob, MARI 10/18/2019  8:14 AM    Future Appointments   Date Time Provider Brady Bates   10/18/2019  8:30 AM Wilfred Romo, PTA [facility]   10/21/2019  4:00 PM Lorena Ames, PTA [facility]   10/21/2019  4:45 PM Jose Martin Sims [id number] [facility]   10/24/2019  3:00 PM Nanci Cantu, PT [id number] [facility]   10/24/2019  3:30 PM Kimmy Jackson, OTR/L [facility] 10/29/2019  3:00 PM Travis Holman, PT [id number] [facility]   10/29/2019  3:30 PM Al DUMONT [facility]   10/31/2019 11:30 AM Shad Iraheta, PTA [facility]   10/31/2019 12:00 PM Macy Ramirez OTR/L [facility]

## 2019-10-18 NOTE — PROGRESS NOTES
[facility] - Renate Jenkins  [address] [facility]  [phone] fax    Physical Therapy Progress Note  Patient name: Krystina Quijano Start of Care: 19   Referral source: Pipo Tian NP : 1990   Medical/Treatment Diagnosis: Gait instability [R26.81]  Balance disorder [R26.89]  Payor: Niru Esqueda / Plan: Jacquelin Lennox / Product Type: Managed Care Medicaid /  Onset Date:1990     Prior Hospitalization: see medical history Provider#: [id number]   Medications: Verified on Patient Summary List    Comorbidities: MS, asthma   Prior Level of Function:functionally independent,    Visits from Start of Care: 2    Missed Visits: 0    Established Goals:         Excellent           Good         Limited           None  [] Increased ROM   []  []  [x]  []  [] Increased Strength  []  []  [x]  []  [] Increased Mobility  []  []  [x]  []   [] Decreased Pain   []  []  []  []  [] Decreased Swelling  []  []  []  []    Key Functional Changes:     Short Term Goals: To be accomplished in 1 weeks:  1. Patient will be compliant with HEP to strength and stability in order to increase ease of transfers. Met- pt reports compliance 10/18/19  Long Term Goals: To be accomplished in 6 weeks:  1. Patient will complete TUG test in 25 seconds or less to increase ease of household ambulation. Not met- TUG 35 sec 10/18/19  2. Patient will maintain Romberg for 30 seconds without evidence of instability to improve ease of daily activities. Progressing- Performed Rhomb with 1 UE support and ucdptwb41/18/19  3. Patient will increase FOTO score by 17 pts indicating significant improvement in function and QOL. Progressing- FOTO 34 10/18/19  4.  Patient will perform 5 sit to stands without UE support to demonstrate improved LE stability for ease of transfers.    Progressing- Performed sit to stand x 5 with bilateral UE support, poor ecc control in 38 seconds    Updated Goals: to be achieved in 6 weeks:   Cont with goals set at Eval    ASSESSMENT/RECOMMENDATIONS: Pt has had 1 month lapse in treatment following Eval on 9/18/19 due to scheduling difficulties. Pt reports compliance with HEP and is now able to attend PT regularly. Cont progressing towards goals set at Eval to improve ease with transfers, activity tolerance, improve safety with amb. [x]Continue therapy per initial plan/protocol at a frequency of  2-3 x per week for 6 weeks  []Continue therapy with the following recommended changes:_____________________      _____________________________________________________________________  []Discontinue therapy progressing towards or have reached established goals  []Discontinue therapy due to lack of appreciable progress towards goals  []Discontinue therapy due to lack of attendance or compliance  []Await Physician's recommendations/decisions regarding therapy  []Other:________________________________________________________________    Thank you for this referral.   Beto Cook, PTA 10/18/2019 9:02 AM    NOTE TO PHYSICIAN:  PLEASE COMPLETE THE ORDERS BELOW AND   FAX TO [facility]: [phone]  If you are unable to process this request in 24 hours please contact our office: [phone] I have read the above report and request that my patient continue as recommended. ? I have read the above report and request that my patient continue therapy with the following changes/special instructions:____________________________________  ? I have read the above report and request that my patient be discharged from therapy.     Physicians signature: ______________________________Date: ______Time:______

## 2019-10-18 NOTE — PROGRESS NOTES
[facility] - Renate Jenkins  [address] [facility]  [phone] fax    Occupational Therapy Progress Note  Patient name: Krystina Quijano Start of Care: 19   Referral source: Pipo Tian NP : 1990   Medical/Treatment Diagnosis: Lack of coordination [R27.9]  Multiple sclerosis [G35]  Payor: Niru Danger / Plan: Jacquelin Lennox / Product Type: Managed Care Medicaid /  Onset Date:1 year     Prior Hospitalization: see medical history Provider#: [id number]   Medications: Verified on Patient Summary List    Comorbidities: Asthma  Prior Level of Function:, group home, I in self care, home care, driving  Visits from Start of Care: 2    Missed Visits: 0    Established Goals:         Excellent           Good         Limited           None  [] Increased ROM   []  []  []  []  [] Increased Strength  []  []  []  []  [] Increased Mobility  []  []  []  []   [] Decreased Pain   []  []  []  []  [] Decreased Swelling  []  []  []  []  [] Increased Fine Motor Skills []  []  [x]  []  [] Increased ADL [place] []  []  [x]  []    Key Functional Changes: Minimal changes noted as patient has only been seen once since initial evaluation. Prior goals and progress:  Short Term Goals: To be accomplished in 2 weeks:  1.  Patient will be independent in [facility] for fine motor skills ot improve writing.     2.  Patient will be familiar with adaptive strategies to remember tasks to be performed. Status at progress note: Started with discussing using notes at home 10/18/19  3.  Patient will be able to accurately attend to divided attention tasks without loss of focus for 15 minutes. Status at progress note: progressing with visual memory tasks 10/18/19     Long Term Goals: To be accomplished in 4 weeks:          1.  Patient will be able to manage personal and family obligations without reminders form others.   Status at progress note: Progressing with Patient is reporting having an increase in chest pain, moving to the left side. EDMD aware.   recall strategies 10/18/19  2.  Patient will use strategies to improve recalll of informations without cueing  Status at progress note: progressing with visual memory tasks 10/18/19  3.  Patient will increase fine motor skills in both  hand by 30% to ease fasteners  Status at progress note: Progressing with fine motor coordination tasks 10/18/19     Updated Goals: (all goals continued due to limited visits)  1.  Patient will be independent in [facility] for fine motor skills ot improve writing.  Franchesca Curran will be familiar with adaptive strategies to remember tasks to be performed. 3.  Patient will be able to accurately attend to divided attention tasks without loss of focus for 15 minutes.       Long Term Goals: To be accomplished in 4 weeks:          1.  Patient will be able to manage personal and family obligations without reminders form others. 2.  Patient will use strategies to improve recalll of informations without cueing  3.  Patient will increase fine motor skills in both  hand by 30% to ease fasteners      ASSESSMENT/RECOMMENDATIONS:  [x]Continue therapy per initial plan/protocol at a frequency of 3 x per week for 4 weeks  []Continue therapy with the following recommended changes:_____________________      _____________________________________________________________________  []Discontinue therapy progressing towards or have reached established goals  []Discontinue therapy due to lack of appreciable progress towards goals  []Discontinue therapy due to lack of attendance or compliance  []Await Physician's recommendations/decisions regarding therapy  []Other:________________________________________________________________    Thank you for this referral.   Aung MITCHELL 10/18/2019 2:27 PM  NOTE TO PHYSICIAN:  [address] AND   FAX TO [facility]: [phone]  If you are unable to process this request in 24 hours please contact our office: [phone]  I have read the above report and request that my patient continue as recommended. ? I have read the above report and request that my patient continue therapy with the following changes/special instructions:________________________________________________________  ? I have read the above report and request that my patient be discharged from therapy.     [de-identified] Signature:____________Date:_________TIME:________    [employer], Date and Time must be completed for valid certification **

## 2019-10-21 ENCOUNTER — HOSPITAL ENCOUNTER (OUTPATIENT)
Dept: PHYSICAL THERAPY | Age: 29
End: 2019-10-21
Payer: MEDICAID

## 2019-10-24 ENCOUNTER — APPOINTMENT (OUTPATIENT)
Dept: PHYSICAL THERAPY | Age: 29
End: 2019-10-24
Payer: MEDICAID

## 2019-10-29 ENCOUNTER — HOSPITAL ENCOUNTER (OUTPATIENT)
Dept: PHYSICAL THERAPY | Age: 29
Discharge: HOME | End: 2019-10-29
Payer: MEDICAID

## 2019-10-29 PROCEDURE — 97112 NEUROMUSCULAR REEDUCATION: CPT

## 2019-10-29 PROCEDURE — 97530 THERAPEUTIC ACTIVITIES: CPT

## 2019-10-29 PROCEDURE — 97110 THERAPEUTIC EXERCISES: CPT

## 2019-10-29 NOTE — PROGRESS NOTES
PT DAILY TREATMENT NOTE 10-18    Patient Name: Derrek Herbert  Date:10/29/2019  : 1990  [x]  Patient  Verified  Payor: Uzma Ardon / Plan: [employer] / Product Type: Managed Care Medicaid /    In time:300  Out time:330  Total Treatment Time (min): 30  Visit #: 3 of       Treatment Area: Lack of coordination [R27.9]  Gait instability [R26.81]    SUBJECTIVE  Pain Level (0-10 scale): 0  Any medication changes, allergies to medications, adverse drug reactions, diagnosis change, or new procedure performed?: [x] No    [] Yes (see summary sheet for update)  Subjective functional status/changes:   [] No changes reported  Pt states she had to cancel last weeks appointment, uses medicaid transportation has to call a week in advanced in order to get on transportation schedule.      OBJECTIVE    20 min Therapeutic Exercise:  [x] See flow sheet :   Rationale: increase ROM and increase strength to improve the patients ability to perform ADLs    10 min Neuromuscular Re-education:  [x]  See flow sheet :  Working on co-contraction of LE's and control into extension on leg press in order to reduce knee hyperextension in standing/walking, 6\" step tap with 1 UE support   Rationale: increase strength, improve coordination, improve balance and increase proprioception  to improve the patients ability to perform functional tasks         With   [] TE   [] TA   [] neuro   [] other: Patient Education: [x] Review HEP    [] Progressed/Changed HEP based on:   [] positioning   [] body mechanics   [] transfers   [] heat/ice application    [] other:      Other Objective/Functional Measures:   Hyperextension B knee in standing/walking  Educated pt on controlling knee extension to reduce feeling of buckling when bending knee from extended position  Needs 1 UE support for step tap and standing hip abduction  Practiced eccentric lowering( sit to stand)           Pain Level (0-10 scale) post treatment: 0    ASSESSMENT/Changes in Function: Pt pt has missed therapy appointments due to issue/lack of transportation. She hyperextends B knees it standing/walking and working on LE co-contraction when performing standing there-ex and on leg press. Pt challenged with eccentric lowering to slightly raised mat table from standing. Patient will continue to benefit from skilled PT services to modify and progress therapeutic interventions, address functional mobility deficits, address ROM deficits, address strength deficits, analyze and address soft tissue restrictions, analyze and cue movement patterns, analyze and modify body mechanics/ergonomics and address imbalance/dizziness to attain remaining goals. [x]  See Plan of Care  []  See progress note/recertification  []  See Discharge Summary         Progress towards goals / Updated goals:  Short Term Goals: To be accomplished in 1 weeks:  1. Patient will be compliant with HEP to strength and stability in order to increase ease of transfers. Met- pt reports compliance 10/18/19  Long Term Goals: To be accomplished in 6 weeks:  1. Patient will complete TUG test in 25 seconds or less to increase ease of household ambulation. Not met- TUG 35 sec 10/18/19  2. Patient will maintain Romberg for 30 seconds without evidence of instability to improve ease of daily activities. Not met   Progressing- Performed [place] with 1 UE support and instable 10/18/19  3. Patient will increase FOTO score by 17 pts indicating significant improvement in function and QOL. Progressing- FOTO 34 10/18/19  4. Patient will perform 5 sit to stands without UE support to demonstrate improved LE stability for ease of transfers.     Progressing- Performed sit to stand x 5 with bilateral UE support, poor ecc control in 38 seconds    PLAN  []  Upgrade activities as tolerated     [x]  Continue plan of care  []  Update interventions per flow sheet       []  Discharge due to:_  []  Other:_      Petr Perkins Giovanny Jones, PT 10/29/2019  8:14 AM    Future Appointments   Date Time Provider Brady Jana   10/29/2019  3:30 PM Lux Anton LJJPSXP [address]   10/31/2019 11:30 AM [facility]blood [facility]   10/31/2019 12:00 PM Kristal Brooks OTR/L [facility]
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
## 2019-10-31 ENCOUNTER — HOSPITAL ENCOUNTER (OUTPATIENT)
Dept: PHYSICAL THERAPY | Age: 29
End: 2019-10-31
Payer: MEDICAID

## 2019-11-04 ENCOUNTER — APPOINTMENT (OUTPATIENT)
Dept: PHYSICAL THERAPY | Age: 29
End: 2019-11-04
Payer: MEDICAID

## 2019-11-05 ENCOUNTER — HOSPITAL ENCOUNTER (OUTPATIENT)
Dept: PHYSICAL THERAPY | Age: 29
Discharge: HOME | End: 2019-11-05
Payer: MEDICAID

## 2019-11-05 PROCEDURE — 97530 THERAPEUTIC ACTIVITIES: CPT

## 2019-11-05 NOTE — PROGRESS NOTES
OT DAILY TREATMENT NOTE 10-18    Patient Name: Dimitris Gonzalez  Date:2019  : 1990  [x]  Patient  Verified  Payor: Arbutus Koyanagi / Plan: [facility] MEDICAID / Product Type: Managed Care Medicaid /    In time:245  Out time:330  Total Treatment Time (min): 45  Visit #: 2 of 12      Treatment Area: Lack of coordination [R27.9]  Multiple sclerosis [G35]    SUBJECTIVE  Pain Level (0-10 scale): 0/10  Any medication changes, allergies to medications, adverse drug reactions, diagnosis change, or new procedure performed?: [x] No    [] Yes (see summary sheet for update)  Subjective functional status/changes:   [] No changes reported  I think my schedule should be done. I need to call and set it up. Last time I missed it was my fault. OBJECTIVE    45 min Therapeutic Activity:  []  See flow sheet :   Rationale: increase ROM and improve coordination  to improve the patients ability to manipulate small items    Grooved Pegs: Timed, B Hands, see chart below- multiple drops during tasks   Organizing functional information worksheet: used for information organization and prolonged attention to task . Decrease in writing legibility as worksheet progressed.  Pen switched out midway for PenAgain adaptive pen, improved legibility after switch           With   [] TE   [] TA   [] neuro   [] other: Patient Education: [x] Review HEP    [] Progressed/Changed HEP based on:   [] positioning   [] body mechanics   [] transfers   [] heat/ice application   [] Splint wear/care   [] Sensory re-education   [] scar management      [] other:            Other Objective/Functional Measures:     Grooved Pegs   11/5   Right 7:42   Left 13/ in 8:22          Improved legibility with use of PenAgain adaptive pen     Pain Level (0-10 scale) post treatment: 0/10    ASSESSMENT/Changes in Function: ongoing difficulty with attention to task     Patient will continue to benefit from skilled OT services to modify and progress therapeutic interventions, address ROM deficits, address strength deficits and instruct in home and community integration to attain remaining goals. []  See Plan of Care  []  See progress note/recertification  []  See Discharge Summary         Progress towards goals / Updated goals:  1.  Patient will be independent in HEP for fine motor skills ot improve writing. Status at Last Progress Note: Not Adressed  Status at Last Visit: Initiated on this date 10/29/19     2.  Patient will be familiar with adaptive strategies to remember tasks to be performed. Status at Last Progress Note:Started with discussing using notes at home   Status at Last Visit: Met per patient report, patient using phone calendar/notes 11/5/19     3.  Patient will be able to accurately attend to divided attention tasks without loss of focus for 15 minutes. Status at Last Progress Note:progressing with visual memory tasks   Status at Last Visit: Required cueing for redirection to task 10/29/19     Long Term Goals: To be accomplished in 4 weeks:          1.  Patient will be able to manage personal and family obligations without reminders form others.   Status at Last Progress Note: Progressing with recall strategies   Status at Last Visit: Progressing with use of cell phone 11/5/19     2.  Patient will use strategies to improve recalll of informations without cueing  Status at Last Progress Note: progressing with visual memory tasks    Status at Last Visit: Progressing with use of cell phone per patient report 11/5/19     3.  Patient will increase fine motor skills in both  hand by 30% to ease fasteners  Status at Last Progress Note: Progressing with fine motor coordination tasks   Status at Last Visit: Progressing with in clinic tasks, mod difficulty with grooved pegs 11/5/19     PLAN  []  Upgrade activities as tolerated     [x]  Continue plan of care  []  Update interventions per flow sheet       []  Discharge due to:_  []  Other:_      [place] ,ANGEL/L 11/5/2019  2:34 PM    Future Appointments   Date Time Provider Brady Jana   11/5/2019  2:45 PM Rosemary Medici SGRVQGR [facility]   11/7/2019  2:30 PM Rosemary Medici PZKONDQ [facility]   11/12/2019  2:00 PM Rosemary Medici BLEWJFW [facility]   11/12/2019  3:00 PM Montez Ventura, PT [id number] [facility]   11/15/2019  3:00 PM Herlene Chain, PTA [facility]   11/15/2019  3:30 PM Voncille Limber, OTR/L [facility]   11/19/2019  2:45 PM Voncille Limber, OTR/L [facility]   11/19/2019  3:30 PM Herlene Chain, PTA [facility]   11/22/2019  2:45 PM Rosemary Medici RFGIPTX [facility]   11/22/2019  3:30 PM Herlene Chain, PTA [facility]   11/26/2019  3:30 PM Herlene Chain, PTA [facility]   11/26/2019  4:15 PM Rosemary Medici LHGOCPM [facility]   11/27/2019  4:00 PM Montez Ventura, PT ISYAIUG [facility]   11/27/2019  4:45 PM Rosemary Medici EEHGEYW [facility]

## 2019-11-07 ENCOUNTER — APPOINTMENT (OUTPATIENT)
Dept: PHYSICAL THERAPY | Age: 29
End: 2019-11-07
Payer: MEDICAID

## 2019-11-11 ENCOUNTER — APPOINTMENT (OUTPATIENT)
Dept: PHYSICAL THERAPY | Age: 29
End: 2019-11-11
Payer: MEDICAID

## 2019-11-12 ENCOUNTER — TELEPHONE (OUTPATIENT)
Dept: PHYSICAL THERAPY | Age: 29
End: 2019-11-12

## 2019-11-13 ENCOUNTER — APPOINTMENT (OUTPATIENT)
Dept: PHYSICAL THERAPY | Age: 29
End: 2019-11-13
Payer: MEDICAID

## 2019-11-15 ENCOUNTER — HOSPITAL ENCOUNTER (OUTPATIENT)
Dept: PHYSICAL THERAPY | Age: 29
Discharge: HOME | End: 2019-11-15
Payer: MEDICAID

## 2019-11-15 PROCEDURE — 97110 THERAPEUTIC EXERCISES: CPT

## 2019-11-15 PROCEDURE — 97530 THERAPEUTIC ACTIVITIES: CPT

## 2019-11-15 NOTE — PROGRESS NOTES
[facility] [place]  [address] [facility]  [phone] fax    Occupational Therapy Progress Note  Patient name: Domingo Ospina Start of Care: 19   Referral source: Maco Regalado NP : 1990   Medical/Treatment Diagnosis: Lack of coordination [R27.9]  Multiple sclerosis [G35]  Payor: Felicitas Gutierrez / Plan: Denise Mohan / Product Type: Managed Care Medicaid /  Onset Date:1 year     Prior Hospitalization: see medical history Provider#: [id number]   Medications: Verified on Patient Summary List    Comorbidities: Asthma  Prior Level of Function:, group home, I in self care, home care, driving  Visits from Start of Care: 5    Missed Visits: 2    Established Goals:         Excellent           Good         Limited           None  [] Increased ROM   []  []  []  []  [] Increased Strength  []  []  []  []  [] Increased Mobility  []  []  []  []   [] Decreased Pain   []  []  []  []  [] Decreased Swelling  []  []  []  []  [x] Increased Fine Motor Skills []  []  []  [x]  [x] Increased ADL [place] []  []  [x]  []    Key Functional Changes: Minimal changes noted as patient has had limited visits since previous progress note. Patient is reporting implementing organizational strategies while running errands. 1.  Patient will be independent in [facility] for fine motor skills ot improve writing. Status at Last Progress Note: Not Adressed  Current status: Initiated on this date 10/29/19. Cueing needed 11/15/19     2.  Patient will be familiar with adaptive strategies to remember tasks to be performed. Status at Last Progress Note:Started with discussing using notes at [facility]  Current status:Goal met. Reported using phone for note taking 11/15/19    3.  Patient will be able to accurately attend to divided attention tasks without loss of focus for 15 minutes.   Status at Last Progress Note:progressing with visual memory tasks   Current status: Required cueing for redirection to task 11/15/19     Long Term Goals: To be accomplished in 4 weeks:          1.  Patient will be able to manage personal and family obligations without reminders form others. Status at Last Progress Note: Progressing with recall strategies   Current status: Progressing using phone for keeping notes, check for consistency 11/15/19     2.  Patient will use strategies to improve recalll of informations without cueing  Status at Last Progress Note: progressing with visual memory tasks   Current status: Progressing with organizational memory tasks, intermittent cueing still needed 11/15/19     3.  Patient will increase fine motor skills in both  hand by 30% to ease fasteners  Status at Last Progress Note: Progressing with fine motor coordination tasks   Current status: Not met, scores virtually unchanged from previous progress check. 9-hole peg score: 71 seconds for left hand, 46 seconds right hand 11/15/19    Updated Goals: to be achieved in 4 weeks:  1.  Patient will be independent in [facility] for fine motor skills ot improve writing  3.  Patient will be able to accurately attend to divided attention tasks without loss of focus for 15 minutes. 1.  Patient will be able to manage personal and family obligations without reminders form others.   2.  Patient will use strategies to improve recalll of informations without cueing  3.  Patient will increase fine motor skills in both  hand by 30% to ease fasteners    ASSESSMENT/RECOMMENDATIONS:  [x]Continue therapy per initial plan/protocol at a frequency of  3 x per week for 4 weeks  []Continue therapy with the following recommended changes:_____________________      _____________________________________________________________________  []Discontinue therapy progressing towards or have reached established goals  []Discontinue therapy due to lack of appreciable progress towards goals  []Discontinue therapy due to lack of attendance or compliance  []Await Physician's recommendations/decisions regarding therapy  []Other:________________________________________________________________    Thank you for this referral.   Julian MITCHELL 11/15/2019 4:37 PM  NOTE TO PHYSICIAN:  PLEASE COMPLETE THE ORDERS BELOW AND   FAX TO [facility]: [phone]  If you are unable to process this request in 24 hours please contact our office: [phone]    ? I have read the above report and request that my patient continue as recommended. ? I have read the above report and request that my patient continue therapy with the following changes/special instructions:________________________________________________________  ? I have read the above report and request that my patient be discharged from therapy.     [de-identified] Signature:____________Date:_________TIME:________    [employer] [employer], Date and Time must be completed for valid certification **

## 2019-11-15 NOTE — PROGRESS NOTES
PT DAILY TREATMENT NOTE 10-18    Patient Name: Yair Joiner  Date:11/15/2019  : 1990  [x]  Patient  Verified  Payor: Charissa Weems / [place] / Product Type: Managed Care Medicaid /    In time: 3:02 Out time:3:30  Total Treatment Time (min): 28  Visit #: 4 of       Treatment Area: Lack of coordination [R27.9]  Gait instability [R26.81]    SUBJECTIVE  Pain Level (0-10 scale): 0/10  Any medication changes, allergies to medications, adverse drug reactions, diagnosis change, or new procedure performed?: [x] No    [] Yes (see summary sheet for update)  Subjective functional status/changes:   [] No changes reported  Pt states she doesn't know what type of MS she has. She reports the cold, damp weather has her joints hurting today. She hasn't been doing much at home and usually watches TV. She wants to work on her balance and strength so she can walk better and transfer better. She states her rides are set up better now. OBJECTIVE    8 min Therapeutic Exercise:  [x] See flow sheet : trial of bike for warmup and pt education   Rationale: increase ROM and increase strength to improve the patients ability to perform ADLs    20 min Therapeutic Activity: TUG, Sit to stands   Rationale: increase strength, improve coordination, improve balance and increase proprioception  to improve the patients ability to perform functional tasks         With   [] TE   [] TA   [] neuro   [] other: Patient Education: [x] Review HEP    [] Progressed/Changed HEP based on:   [] positioning   [] body mechanics   [] transfers   [] heat/ice application    [] other:      Other Objective/Functional Measures:   TU seconds  Romberg: unable 3 seconds  FOTO: 16  Sit to stands without UEs: unable    Pain Level (0-10 scale) post treatment: 0/10    ASSESSMENT/Changes in Function:  See Progress Note. Progress towards goals / Updated goals:  Short Term Goals: To be accomplished in 1 weeks:  1.  Patient will be compliant with HEP to strength and stability in order to increase ease of transfers. Met- pt reports compliance 10/18/19  Long Term Goals: To be accomplished in 6 weeks:  1. Patient will complete TUG test in 25 seconds or less to increase ease of household ambulation. Not met- TUG 35 sec 10/18/19 42 seconds   2. Patient will maintain Romberg for 30 seconds without evidence of instability to improve ease of daily activities. Not met   3 seconds, 5 seconds, 5 seconds  Progressing- Performed Rhomb with 1 UE support and instable 10/18/19  3. Patient will increase FOTO score by 17 pts indicating significant improvement in function and QOL. Progressing- FOTO 34 10/18/19  4. Patient will perform 5 sit to stands without UE support to demonstrate improved LE stability for ease of transfers.     Progressing- Performed sit to stand x 5 with bilateral UE support, poor ecc control in 38 seconds  Unable to perform without UEs     PLAN  [x]  Upgrade activities as tolerated     [x]  Continue plan of care  []  Update interventions per flow sheet       []  Discharge due to:_  []  Other:_      Jadon Rivera PTA 11/15/2019  8:14 AM    Future Appointments   Date Time Provider Brady Bates   11/15/2019  3:00 PM Patricia Bardales PTA MMCPTPB [facility]   11/15/2019  3:30 PM Grabiel Beckman OTR/L [facility]CP[facility]   11/19/2019  2:45 PM Noemí Muse SKDLJDM [facility]   11/19/2019  3:30 PM Patricia Bardales PTA [facility]   11/22/2019  2:45 PM Noemí DONAHUEYOBMH [facility]   11/22/2019  3:30 PM Patricia Bardales PTA [facility]   11/26/2019  3:30 PM Christiano Maldonado PTA [facility]   11/26/2019  4:15 PM Noemí Muse INPYQIP [facility]   11/27/2019  4:00 PM Lubna Fish PT [id number] [facility]   11/27/2019  4:45 PM Noemí LEARYSGACS [facility]

## 2019-11-15 NOTE — PROGRESS NOTES
OT DAILY TREATMENT NOTE 10-18    Patient Name: Audrey Flannery  Date:11/15/2019  : 1990  [x]  Patient  Verified  Payor: Dilip Prasad / [place]: [place] / Product Type: Managed Care Medicaid /    In time:3:32  Out time:4:20  Total Treatment Time (min): 48  Visit #: 5 of 12      Treatment Area: Lack of coordination [R27.9]  Multiple sclerosis [G35]    SUBJECTIVE  Pain Level (0-10 scale): 0/10  Any medication changes, allergies to medications, adverse drug reactions, diagnosis change, or new procedure performed?: [x] No    [] Yes (see summary sheet for update)  Subjective functional status/changes:   [] No changes reported  Using phone to keep notes while running errands    OBJECTIVE    48 min Therapeutic Activity:  []  See flow sheet :   Rationale: increase ROM, improve coordination and increase proprioception  to improve the patients ability to manipulate items, attend to task, write     Recheck fine motor coordination using 9-hole peg test  Organization/memory worksheet  Organization/logic worksheet  Recall with interference using playing cards 15 x    With   [] TE   [] TA   [] neuro   [] other: Patient Education: [x] Review HEP    [] Progressed/Changed HEP based on:   [] positioning   [] body mechanics   [] transfers   [] heat/ice application   [] Splint wear/care   [] Sensory re-education   [] scar management      [x] other: Review using self cueing verbally to assist with recall           Other Objective/Functional Measures:   (Current scores in bold)  Nine-Hole Peg Test:  Left= __68_(71)__seconds                        Right=___45_(46)_seconds     12/15 correct during recall with interference  5/6 correct during organizational/memory worksheet  Redirection required to attend to task throughout session    Pain Level (0-10 scale) post treatment: 0/10    ASSESSMENT/Changes in Function: Coordination, memory similar to previous progress check, likely d/t limited visits    Patient will continue to benefit from skilled OT services to modify and progress therapeutic interventions, address ROM deficits, address imbalance/dizziness and instruct in home and community integration to attain remaining goals. []  See Plan of Care  [x]  See progress note/recertification  []  See Discharge Summary         Progress towards goals / Updated goals:  1.  Patient will be independent in HEP for fine motor skills ot improve writing. Status at Last Progress Note: Not Adressed  Status at Last Visit: Initiated on this date 10/29/19. Cueing needed 11/15/19     2.  Patient will be familiar with adaptive strategies to remember tasks to be performed. Status at Last Progress Note:Started with discussing using notes at home   Status at Last Visit: Reported using phone for note taking 11/15/19     3.  Patient will be able to accurately attend to divided attention tasks without loss of focus for 15 minutes. Status at Last Progress Note:progressing with visual memory tasks   Status at Last Visit: Required cueing for redirection to task 11/15/19     Long Term Goals: To be accomplished in 4 weeks:          1.  Patient will be able to manage personal and family obligations without reminders form others.   Status at Last Progress Note: Progressing with recall strategies   Status at Last Visit: Progressing using phone for keeping notes 11/15/19     2.  Patient will use strategies to improve recalll of informations without cueing  Status at Last Progress Note: progressing with visual memory tasks   Status at Last Visit: Progressing with organizational memory tasks, intermittent cueing still needed 11/15/19     3.  Patient will increase fine motor skills in both  hand by 30% to ease fasteners  Status at Last Progress Note: Progressing with fine motor coordination tasks   Status at Last Visit: 9-hole peg score: 71 seconds for left hand, 46 seconds right hand 11/15/19    PLAN  []  Upgrade activities as tolerated     [x]  Continue plan of care  []  Update interventions per flow sheet       []  Discharge due to:_  []  Other:_      Carroll Paez, OTS 11/15/2019  3:37 PM    Future Appointments   Date Time Provider Brady Bates   11/19/2019  2:45 PM Madhu Lakes SNVNFDP [facility]   11/19/2019  3:30 PM Selma Chamber, PTA [facility]CPTPB [facility]   11/22/2019  2:45 PM Madhu Lakes YMLUPOT [facility]   11/22/2019  3:30 PM Selma Chamber, PTA MMCPTPB [facility]   11/26/2019  3:30 PM Misael Yee, PTA [facility]CPTPB [facility]   11/26/2019  4:15 PM Madhu Lakes ZSJQCDE [facility]   11/27/2019  4:00 PM Duglas Avalos, PT IRPOCTQ [facility]   11/27/2019  4:45 PM Madhu Lakes DQTGTRX [facility]

## 2019-11-18 NOTE — PROGRESS NOTES
[facility] [place]  [address]  [phone] fax    Physical Therapy Progress Note  Patient name: Anny Carlisle Start of Care: 2019   Referral source: Danita Zavala NP : 1990   Medical/Treatment Diagnosis: Lack of coordination [R27.9]  Gait instability [R26.81]  Payor: Cuauhtemoc Gallo / Plan: Annie Lesches / Product Type: Managed Care Medicaid /  Onset Date:2018     Prior Hospitalization: see medical history Provider#: [id number]   Medications: Verified on Patient Summary List    Comorbidities: MS, asthma   Prior Level of Function:functionally independent,           Visits from Start of Care: 4   Missed Visits: 3    Established Goals:         Excellent           Good         Limited           None  [] Increased ROM   []  []  []  []  [x] Increased Strength  []  []  [x]  []  [x] Increased Mobility  []  []  [x]  []   [] Decreased Pain   []  []  []  []  [] Decreased Swelling  []  []  []  []    Key Functional Changes: N/A only 4 visits since I.E. In 2 month time span due to issues with setting up transportation    [address] G[address] Kinza[address]in be accomplished in 1 weeks:  1. Patient will be compliant with HEP to strength and stability in order to increase ease of transfers. Met- pt reports compliance 10/18/19  Long Term Goals: To be accomplished in 6 weeks:  1. Patient will complete TUG test in 25 seconds or less to increase ease of household ambulation. Not met- TUG 35 sec 10/18/19 42 seconds   2. Patient will maintain Romberg for 30 seconds without evidence of instability to improve ease of daily activities. Not met   3 seconds, 5 seconds, 5 seconds  Progressing- Performed Rhomb with 1 UE support and instable 10/18/19  3. Patient will increase FOTO score by 17 pts indicating significant improvement in function and QOL. Progressing- FOTO 34 10/18/19  4.  Patient will perform 5 sit to stands without UE support to demonstrate improved LE stability for ease of transfers.    Progressing- Performed sit to stand x 5 with bilateral UE support, poor ecc control in 38 seconds  Unable to perform without UEs     Updated Goals: to be achieved in 6 weeks:  1. Patient will complete TUG test in 25 seconds or less to increase ease of household ambulation. 2. Patient will maintain Romberg for 30 seconds without evidence of instability to improve ease of daily activities. 3. Patient will increase FOTO score by 17 pts indicating significant improvement in function and QOL. 4. Patient will perform 5 sit to stands without UE support to demonstrate improved LE stability for ease of transfers. ASSESSMENT/RECOMMENDATIONS: Miss Karole Duane has made limited progress towards initial goals due to only attending 4 visits since initial evaluation due to difficulty with setting up transportation. She continues with decreased balance and poor ability to perform transfers without excessive use of UEs. She is utilizing a RW for all ambulation and has a sedentary lifestyle at home due to diagnosis. Skilled PT remains medically necessary to educate on energy conservation techniques and improve safety and balance for decreased fall risk at home and in the community.      [x]Continue therapy per initial plan/protocol at a frequency of  2 x per week for 6 weeks  []Continue therapy with the following recommended changes:_____________________      _____________________________________________________________________  []Discontinue therapy progressing towards or have reached established goals  []Discontinue therapy due to lack of appreciable progress towards goals  []Discontinue therapy due to lack of attendance or compliance  []Await Physician's recommendations/decisions regarding therapy  []Other:________________________________________________________________    Thank you for this referral.   Flako Llamas PTA 11/18/2019 8:44 AM    NOTE TO PHYSICIAN:  PLEASE COMPLETE THE ORDERS BELOW AND   FAX TO [facility]: [phone]  If you are unable to process this request in 24 hours please contact our office: [phone] I have read the above report and request that my patient continue as recommended. ? I have read the above report and request that my patient continue therapy with the following changes/special instructions:____________________________________  ? I have read the above report and request that my patient be discharged from therapy.     Physicians signature: ______________________________Date: ______Time:______

## 2019-11-19 ENCOUNTER — TELEPHONE (OUTPATIENT)
Dept: PHYSICAL THERAPY | Age: 29
End: 2019-11-19

## 2019-11-22 ENCOUNTER — APPOINTMENT (OUTPATIENT)
Dept: PHYSICAL THERAPY | Age: 29
End: 2019-11-22
Payer: MEDICAID

## 2019-11-25 ENCOUNTER — APPOINTMENT (OUTPATIENT)
Dept: PHYSICAL THERAPY | Age: 29
End: 2019-11-25
Payer: MEDICAID

## 2019-11-26 ENCOUNTER — APPOINTMENT (OUTPATIENT)
Dept: PHYSICAL THERAPY | Age: 29
End: 2019-11-26
Payer: MEDICAID

## 2019-11-27 ENCOUNTER — APPOINTMENT (OUTPATIENT)
Dept: PHYSICAL THERAPY | Age: 29
End: 2019-11-27
Payer: MEDICAID

## 2019-12-06 NOTE — PROGRESS NOTES
[facility] [place] [employer]  [address] [place] [address]  [phone] fax    Physical Therapy Discharge Summary    Patient name: Shaq Fofana Start of Care: 2019   Referral source: Michael Bartholomew NP : 1990   Medical/Treatment Diagnosis: Lack of coordination [R27.9]  Gait instability [R26.81]  Payor: Alicia Perkins / Plan: Keeley Mata / Product Type: Managed Care Medicaid /  Onset Date:2018      Prior Hospitalization: see medical history Provider#: [id number]   Medications: Verified on Patient Summary List     Comorbidities: MS, asthma   Prior Level of Function:functionally independent,           Visits from Start of Care: 4    Missed Visits: 5    Reporting Period : 11/15/2019 to 11/15/2019    Summary of Care:  Goal:. Patient will complete TUG test in 25 seconds or less to increase ease of household ambulation. Status at last note: Not met- TUG 35 sec 10/18/19 42 seconds   Status at discharge: not met- unable to reassess    Goal: Patient will maintain Romberg for 30 seconds without evidence of instability to improve ease of daily activities. Status at last note: Progressing- Performed Rhomb with 1 UE support and instable   Status at discharge:not met- unable to reassess    Goal:. Patient will increase FOTO score by 17 pts indicating significant improvement in function and QOL. Status at last note: Progressing- FOTO 34  Status at discharge: not met- unable to resasses    Goal: Patient will perform 5 sit to stands without UE support to demonstrate improved LE stability for ease of transfers.    Status at last note:  Progressing- Performed sit to stand x 5 with bilateral UE support, poor ecc control in 38 seconds  Unable to perform without UEs   Status at discharge: not met- unable to resasses          ASSESSMENT/RECOMMENDATIONS: Patient has made overall limited progress in PT due to poor attendance.  Pt was having difficulty with setting up transportation, which was then discussed with therapists to avoid missing future appointments. Unfortunately pt has missed several appointments, therefore will be discharged from PT.     [x]Discontinue therapy: []Patient has reached or is progressing toward set goals      [x]Patient is non-compliant or has abdicated      []Due to lack of appreciable progress towards set goals    Macario Mauricio, PT 12/6/2019 9:04 AM    NOTE TO PHYSICIAN:  Please complete the following and fax to: [facility] at [facility] at [phone]  Retain this original for your records. If you are unable to process this request in   24 hours, please contact our office.      [] I have read the above report and request that my patient continue therapy with the following changes/special instructions:  [] I have read the above report and request that my patient be discharged from therapy    Physician's Signature:____________Date:_________TIME:________    ** Signature, Date and Time must be completed for valid certification **

## 2020-01-20 NOTE — PROGRESS NOTES
[facility] - [facility] [employer] [facility]  [address] [place] [address]  [phone] fax    Occupational Therapy Discharge Summary    Patient name: Gen Starkey Start of Care: 19   Referral source: Jacob Cisneros NP : 1990   Medical/Treatment Diagnosis: Lack of coordination [R27.9]  Multiple sclerosis [G35]  Payor: Luis Schofield / Plan: Shyann Band / Product Type: Managed Care Medicaid /  Onset Date:1 year     Prior Hospitalization: see medical history Provider#: [id number]   Medications: Verified on Patient Summary List    Comorbidities: Asthma  Prior Level of Function:, group home, I self care home care driving  Visits from Start of Care: 5    Missed Visits: 5  Reporting Period : 11/15/19 to 11/15/19    Summary of Care:Patient was seen for only 5 visits over course of 2 months. She has not returned since note of 11/15/19 and is being discharged for non compliance. Current goals and progress:  1.  Patient will be independent in HEP for fine motor skills ot improve writing. Status at Last Progress Note: Cueing needed  Discharge  status: Not met     3.  Patient will be able to accurately attend to divided attention tasks without loss of focus for 15 minutes. Status at Last Progress Note:Required cueing for redirection to task  Discharge  status:  Not met  LTGs  1.  Patient will be able to manage personal and family obligations without reminders form others.   Status at Last Progress Note:  Progressing using phone for keeping notes, check for consistency 11/15/19  Discharge  status: Not met     2.  Patient will use strategies to improve recalll of informations without cueing  Status at Last Progress Note:  Progressing with organizational memory tasks, intermittent cueing still needed 11/15/19  Discharge  status: Not met  3.  Patient will increase fine motor skills in both  hand by 30% to ease fasteners  Status at Last Progress Note: Scaores unchanged 9-hole peg score: 71 seconds for left hand, 46 seconds right hand 11/15/19  Discharge  status: Not met    ASSESSMENT/Changes in Function: Minimal change in function due to limited sporadic visits. patient has not been sen since last progress note dated 11/15/19. ASSESSMENT/RECOMMENDATIONS:  [x]Discontinue therapy: []Patient has reached or is progressing toward set goals      [x]Patient is non-compliant or has abdicated      []Due to lack of appreciable progress towards set goals    Shalonda Lindquist OTR/L 1/20/2020 8:23 AM    NOTE TO PHYSICIAN:  Please complete the following and fax to: [facility] at [facility] at [phone]  . Retain this original for your records. If you are unable to process this request in   24 hours, please contact our office.      [] I have read the above report and request that my patient continue therapy with the following changes/special instructions:  [] I have read the above report and request that my patient be discharged from therapy    Physician's Signature:____________Date:_________TIME:________    ** Signature, Date and Time must be completed for valid certification **

## 2020-08-20 PROBLEM — G35 MULTIPLE SCLEROSIS (HCC): Status: ACTIVE | Noted: 2020-08-20

## 2020-08-20 RX ORDER — HYDROCORTISONE SODIUM SUCCINATE 100 MG/2ML
100 INJECTION, POWDER, FOR SOLUTION INTRAMUSCULAR; INTRAVENOUS AS NEEDED
Status: CANCELLED | OUTPATIENT
Start: 2020-08-27

## 2020-08-20 RX ORDER — DIPHENHYDRAMINE HYDROCHLORIDE 50 MG/ML
25 INJECTION, SOLUTION INTRAMUSCULAR; INTRAVENOUS AS NEEDED
Status: CANCELLED
Start: 2020-08-27

## 2020-08-20 RX ORDER — ONDANSETRON 2 MG/ML
8 INJECTION INTRAMUSCULAR; INTRAVENOUS AS NEEDED
Status: CANCELLED | OUTPATIENT
Start: 2020-08-27

## 2020-08-20 RX ORDER — EPINEPHRINE 1 MG/ML
0.3 INJECTION, SOLUTION, CONCENTRATE INTRAVENOUS AS NEEDED
Status: CANCELLED | OUTPATIENT
Start: 2020-08-27

## 2020-08-20 RX ORDER — ACETAMINOPHEN 325 MG/1
650 TABLET ORAL AS NEEDED
Status: CANCELLED
Start: 2020-08-27

## 2020-08-20 RX ORDER — DIPHENHYDRAMINE HYDROCHLORIDE 50 MG/ML
50 INJECTION, SOLUTION INTRAMUSCULAR; INTRAVENOUS AS NEEDED
Status: CANCELLED
Start: 2020-08-27

## 2020-08-20 RX ORDER — ALBUTEROL SULFATE 0.83 MG/ML
2.5 SOLUTION RESPIRATORY (INHALATION) AS NEEDED
Status: CANCELLED
Start: 2020-08-27

## 2020-08-20 RX ORDER — HEPARIN 100 UNIT/ML
300-500 SYRINGE INTRAVENOUS AS NEEDED
Status: CANCELLED
Start: 2020-08-27

## 2020-08-27 ENCOUNTER — HOSPITAL ENCOUNTER (OUTPATIENT)
Dept: INFUSION THERAPY | Age: 30
Discharge: HOME | End: 2020-08-27
Payer: MEDICARE

## 2020-08-27 VITALS
SYSTOLIC BLOOD PRESSURE: 88 MMHG | HEART RATE: 88 BPM | BODY MASS INDEX: 30.91 KG/M2 | WEIGHT: 185.5 LBS | OXYGEN SATURATION: 100 % | DIASTOLIC BLOOD PRESSURE: 52 MMHG | TEMPERATURE: 97.4 F | HEIGHT: 65 IN | RESPIRATION RATE: 16 BRPM

## 2020-08-27 DIAGNOSIS — G35 MULTIPLE SCLEROSIS (HCC): Primary | ICD-10-CM

## 2020-08-27 DIAGNOSIS — G35 MULTIPLE SCLEROSIS (HCC): ICD-10-CM

## 2020-08-27 PROCEDURE — 74011250636 HC RX REV CODE- 250/636: Performed by: NURSE PRACTITIONER

## 2020-08-27 PROCEDURE — 74011000258 HC RX REV CODE- 258: Performed by: NURSE PRACTITIONER

## 2020-08-27 PROCEDURE — 96365 THER/PROPH/DIAG IV INF INIT: CPT

## 2020-08-27 RX ORDER — ACETAMINOPHEN 325 MG/1
650 TABLET ORAL AS NEEDED
Status: CANCELLED
Start: 2020-09-24

## 2020-08-27 RX ORDER — HEPARIN 100 UNIT/ML
300-500 SYRINGE INTRAVENOUS AS NEEDED
Status: CANCELLED
Start: 2020-09-24

## 2020-08-27 RX ORDER — SODIUM CHLORIDE 9 MG/ML
25 INJECTION, SOLUTION INTRAVENOUS CONTINUOUS
Status: DISPENSED | OUTPATIENT
Start: 2020-08-27 | End: 2020-08-27

## 2020-08-27 RX ORDER — HYDROCORTISONE SODIUM SUCCINATE 100 MG/2ML
100 INJECTION, POWDER, FOR SOLUTION INTRAMUSCULAR; INTRAVENOUS AS NEEDED
Status: CANCELLED | OUTPATIENT
Start: 2020-09-24

## 2020-08-27 RX ORDER — ONDANSETRON 2 MG/ML
8 INJECTION INTRAMUSCULAR; INTRAVENOUS AS NEEDED
Status: CANCELLED | OUTPATIENT
Start: 2020-09-24

## 2020-08-27 RX ORDER — SODIUM CHLORIDE 9 MG/ML
10 INJECTION INTRAMUSCULAR; INTRAVENOUS; SUBCUTANEOUS AS NEEDED
Status: ACTIVE | OUTPATIENT
Start: 2020-08-27 | End: 2020-08-27

## 2020-08-27 RX ORDER — ERGOCALCIFEROL 1.25 MG/1
50000 CAPSULE ORAL
COMMUNITY

## 2020-08-27 RX ORDER — EPINEPHRINE 1 MG/ML
0.3 INJECTION, SOLUTION, CONCENTRATE INTRAVENOUS AS NEEDED
Status: CANCELLED | OUTPATIENT
Start: 2020-09-24

## 2020-08-27 RX ORDER — SODIUM CHLORIDE 9 MG/ML
10 INJECTION INTRAMUSCULAR; INTRAVENOUS; SUBCUTANEOUS AS NEEDED
Status: CANCELLED | OUTPATIENT
Start: 2020-09-24

## 2020-08-27 RX ORDER — SODIUM CHLORIDE 0.9 % (FLUSH) 0.9 %
10 SYRINGE (ML) INJECTION AS NEEDED
Status: CANCELLED | OUTPATIENT
Start: 2020-09-24

## 2020-08-27 RX ORDER — SODIUM CHLORIDE 0.9 % (FLUSH) 0.9 %
10 SYRINGE (ML) INJECTION AS NEEDED
Status: DISPENSED | OUTPATIENT
Start: 2020-08-27 | End: 2020-08-27

## 2020-08-27 RX ORDER — ALBUTEROL SULFATE 0.83 MG/ML
2.5 SOLUTION RESPIRATORY (INHALATION) AS NEEDED
Status: CANCELLED
Start: 2020-09-24

## 2020-08-27 RX ORDER — SODIUM CHLORIDE 9 MG/ML
25 INJECTION, SOLUTION INTRAVENOUS CONTINUOUS
Status: CANCELLED | OUTPATIENT
Start: 2020-09-24

## 2020-08-27 RX ORDER — DEXTROAMPHETAMINE SACCHARATE, AMPHETAMINE ASPARTATE, DEXTROAMPHETAMINE SULFATE AND AMPHETAMINE SULFATE 5; 5; 5; 5 MG/1; MG/1; MG/1; MG/1
20 TABLET ORAL 2 TIMES DAILY
COMMUNITY

## 2020-08-27 RX ORDER — DIPHENHYDRAMINE HYDROCHLORIDE 50 MG/ML
25 INJECTION, SOLUTION INTRAMUSCULAR; INTRAVENOUS AS NEEDED
Status: CANCELLED
Start: 2020-09-24

## 2020-08-27 RX ORDER — DIPHENHYDRAMINE HYDROCHLORIDE 50 MG/ML
50 INJECTION, SOLUTION INTRAMUSCULAR; INTRAVENOUS AS NEEDED
Status: CANCELLED
Start: 2020-09-24

## 2020-08-27 RX ADMIN — NATALIZUMAB 300 MG: 300 INJECTION INTRAVENOUS at 12:15

## 2020-08-27 RX ADMIN — Medication 10 ML: at 11:55

## 2020-08-27 RX ADMIN — SODIUM CHLORIDE 25 ML/HR: 9 INJECTION, SOLUTION INTRAVENOUS at 12:05

## 2020-08-27 NOTE — PROGRESS NOTES
[facility] Progress Note    Date: 2020    Name: Zo Smith    MRN: [id number]         : 1990    Natalaizumab (TYSABRI) Infusion    Ms. Deidre Lopez to [facility], ambulatory, at [facility]. Pt was assessed and education was provided. Ms. Porter Scale vitals were reviewed and patient was observed for 5 minutes prior to treatment. Patient denied complaints of pain/discomfort, recent changes to her medication regimen or health condition. Patient denied any adverse effects of prior infusions of Tysabri. Visit Vitals  /73 (BP 1 Location: Right arm, BP Patient Position: At rest;Sitting)   Pulse (!) 112   Temp 98.5 °F (36.9 °C)   Resp 16   Ht 5' 5\" (1.651 m)   Wt 84.1 kg (185 lb 8 oz)   SpO2 100%   BMI 30.87 kg/m²       22 g PIV placed in patient's right antecubital vein  x 1 attempt. PIV flushed easily and had brisk blood return. Site without evidence of complication or patient's complaint. TYSABRI 300 mg IV was initiated @ 125 mL/hr, via peripheral IV line to infuse over 1 hour, as ordered. VS stable and pt denied complaints of itching, lip/tongue/facial swelling, SOB, CP or other complaints. IV site without evidence of infiltration or irritation. Ms. Deidre Lopez tolerated the infusion, and had no complaints. VS remained stable. Patient Vitals for the past 12 hrs:   Temp Pulse Resp BP SpO2   20 1420 97.4 °F (36.3 °C) 88 16 (!) 88/52 100 %   20 1345 98.6 °F (37 °C) 96 16 116/69 99 %   20 1245 98.4 °F (36.9 °C) 98 16 101/67 95 %   20 1138 98.5 °F (36.9 °C) (!) 112 16 113/73 100 %       PIV flushed with NS 10 mL and catheter removed intact. No bleeding, hematoma or other evidence of complication noted at site. Gauze dressing applied to site and wrapped with coban. Patient armband removed and shredded. Ms. Deidre Lopez was discharged from [place] [address] in stable condition at 1425.  She is to return to the [facility] on 2020 at 1100 for her next infusion of Citlaly.       Samanta Grace RN  August 27, 2020  4:02 PM

## 2020-09-24 DIAGNOSIS — G35 MULTIPLE SCLEROSIS (HCC): ICD-10-CM

## 2020-10-22 ENCOUNTER — HOSPITAL ENCOUNTER (OUTPATIENT)
Dept: INFUSION THERAPY | Age: 30
Discharge: HOME | End: 2020-10-22
Payer: MEDICARE

## 2020-10-22 VITALS
OXYGEN SATURATION: 99 % | DIASTOLIC BLOOD PRESSURE: 76 MMHG | SYSTOLIC BLOOD PRESSURE: 119 MMHG | RESPIRATION RATE: 18 BRPM | TEMPERATURE: 98.2 F | HEART RATE: 94 BPM

## 2020-10-22 DIAGNOSIS — G35 MULTIPLE SCLEROSIS (HCC): Primary | ICD-10-CM

## 2020-10-22 DIAGNOSIS — G35 MULTIPLE SCLEROSIS (HCC): ICD-10-CM

## 2020-10-22 LAB
ALBUMIN SERPL-MCNC: 3.4 G/DL (ref 3.4–5)
ALBUMIN/GLOB SERPL: 0.9 {RATIO} (ref 0.8–1.7)
ALP SERPL-CCNC: 57 U/L (ref 45–117)
ALT SERPL-CCNC: 12 U/L (ref 13–56)
ANION GAP SERPL CALC-SCNC: 6 MMOL/L (ref 3–18)
AST SERPL-CCNC: 11 U/L (ref 10–38)
BASO+EOS+MONOS # BLD AUTO: 0.4 K/UL (ref 0–2.3)
BASO+EOS+MONOS NFR BLD AUTO: 6 % (ref 0.1–17)
BILIRUB SERPL-MCNC: 0.3 MG/DL (ref 0.2–1)
BUN SERPL-MCNC: 9 MG/DL (ref 7–18)
BUN/CREAT SERPL: 15 (ref 12–20)
CALCIUM SERPL-MCNC: 8.2 MG/DL (ref 8.5–10.1)
CHLORIDE SERPL-SCNC: 112 MMOL/L (ref 100–111)
CO2 SERPL-SCNC: 25 MMOL/L (ref 21–32)
CREAT SERPL-MCNC: 0.62 MG/DL (ref 0.6–1.3)
DIFFERENTIAL METHOD BLD: ABNORMAL
ERYTHROCYTE [DISTWIDTH] IN BLOOD BY AUTOMATED COUNT: 13.6 % (ref 11.5–14.5)
GLOBULIN SER CALC-MCNC: 3.7 G/DL (ref 2–4)
GLUCOSE SERPL-MCNC: 75 MG/DL (ref 74–99)
HCT VFR BLD AUTO: 32.1 % (ref 36–48)
HGB BLD-MCNC: 10.6 G/DL (ref 12–16)
LYMPHOCYTES # BLD: 2.7 K/UL (ref 1.1–5.9)
LYMPHOCYTES NFR BLD: 40 % (ref 14–44)
MCH RBC QN AUTO: 29.9 PG (ref 25–35)
MCHC RBC AUTO-ENTMCNC: 33 G/DL (ref 31–37)
MCV RBC AUTO: 90.4 FL (ref 78–102)
NEUTS SEG # BLD: 3.7 K/UL (ref 1.8–9.5)
NEUTS SEG NFR BLD: 55 % (ref 40–70)
PLATELET # BLD AUTO: 307 K/UL (ref 140–440)
POTASSIUM SERPL-SCNC: 3.5 MMOL/L (ref 3.5–5.5)
PROT SERPL-MCNC: 7.1 G/DL (ref 6.4–8.2)
RBC # BLD AUTO: 3.55 M/UL (ref 4.1–5.1)
SODIUM SERPL-SCNC: 143 MMOL/L (ref 136–145)
WBC # BLD AUTO: 6.8 K/UL (ref 4.5–13)

## 2020-10-22 PROCEDURE — 85025 COMPLETE CBC W/AUTO DIFF WBC: CPT

## 2020-10-22 PROCEDURE — 96413 CHEMO IV INFUSION 1 HR: CPT

## 2020-10-22 PROCEDURE — 74011250636 HC RX REV CODE- 250/636

## 2020-10-22 PROCEDURE — 96365 THER/PROPH/DIAG IV INF INIT: CPT

## 2020-10-22 PROCEDURE — 80053 COMPREHEN METABOLIC PANEL: CPT

## 2020-10-22 PROCEDURE — 74011000258 HC RX REV CODE- 258

## 2020-10-22 RX ORDER — EPINEPHRINE 1 MG/ML
0.3 INJECTION, SOLUTION, CONCENTRATE INTRAVENOUS AS NEEDED
Status: CANCELLED | OUTPATIENT
Start: 2020-11-19

## 2020-10-22 RX ORDER — SODIUM CHLORIDE 0.9 % (FLUSH) 0.9 %
10 SYRINGE (ML) INJECTION AS NEEDED
Status: CANCELLED | OUTPATIENT
Start: 2020-11-19

## 2020-10-22 RX ORDER — DIPHENHYDRAMINE HYDROCHLORIDE 50 MG/ML
50 INJECTION, SOLUTION INTRAMUSCULAR; INTRAVENOUS AS NEEDED
Status: CANCELLED
Start: 2020-11-19

## 2020-10-22 RX ORDER — ALBUTEROL SULFATE 0.83 MG/ML
2.5 SOLUTION RESPIRATORY (INHALATION) AS NEEDED
Status: CANCELLED
Start: 2020-11-19

## 2020-10-22 RX ORDER — ONDANSETRON 2 MG/ML
8 INJECTION INTRAMUSCULAR; INTRAVENOUS AS NEEDED
Status: CANCELLED | OUTPATIENT
Start: 2020-11-19

## 2020-10-22 RX ORDER — SODIUM CHLORIDE 9 MG/ML
25 INJECTION, SOLUTION INTRAVENOUS CONTINUOUS
Status: CANCELLED | OUTPATIENT
Start: 2020-11-19

## 2020-10-22 RX ORDER — ACETAMINOPHEN 325 MG/1
650 TABLET ORAL AS NEEDED
Status: CANCELLED
Start: 2020-11-19

## 2020-10-22 RX ORDER — SODIUM CHLORIDE 9 MG/ML
25 INJECTION, SOLUTION INTRAVENOUS CONTINUOUS
Status: DISPENSED | OUTPATIENT
Start: 2020-10-22 | End: 2020-10-22

## 2020-10-22 RX ORDER — HYDROCORTISONE SODIUM SUCCINATE 100 MG/2ML
100 INJECTION, POWDER, FOR SOLUTION INTRAMUSCULAR; INTRAVENOUS AS NEEDED
Status: CANCELLED | OUTPATIENT
Start: 2020-11-19

## 2020-10-22 RX ORDER — DIPHENHYDRAMINE HYDROCHLORIDE 50 MG/ML
25 INJECTION, SOLUTION INTRAMUSCULAR; INTRAVENOUS AS NEEDED
Status: CANCELLED
Start: 2020-11-19

## 2020-10-22 RX ORDER — HEPARIN 100 UNIT/ML
300-500 SYRINGE INTRAVENOUS AS NEEDED
Status: CANCELLED
Start: 2020-11-19

## 2020-10-22 RX ORDER — SODIUM CHLORIDE 9 MG/ML
10 INJECTION INTRAMUSCULAR; INTRAVENOUS; SUBCUTANEOUS AS NEEDED
Status: CANCELLED | OUTPATIENT
Start: 2020-11-19

## 2020-10-22 RX ADMIN — NATALIZUMAB 300 MG: 300 INJECTION INTRAVENOUS at 09:48

## 2020-10-22 NOTE — PROGRESS NOTES
[facility] Progress Note    Date: 2020    Name: Wero Still    MRN: [id number]         : 1990    Natalaizumab (TYSABRI) Infusion    Ms. Sidra Leos to [facility], ambulatory, at 0930. Pt was assessed and education was provided. Ms. William Beauchamp vitals were reviewed and patient was observed for 5 minutes prior to treatment. Patient denied any adverse effects of prior infusions of Tysabri. Visit Vitals  /76 (BP 1 Location: Left arm, BP Patient Position: Sitting)   Pulse 94   Temp 98.2 °F (36.8 °C)   Resp 18   SpO2 99%   Breastfeeding No       24 g PIV placed in patient's right forearm x 1 attempt. PIV flushed easily and had brisk blood return, labs obtained for CBC and CMP. TYSABRI 300 mg IV was initiated @ 125 mL/hr, via peripheral IV line to infuse over 1 hour, as ordered. VS stable and pt denied complaints of itching, lip/tongue/facial swelling, SOB, CP or other complaints. IV site without evidence of infiltration or irritation. Ms. Sidra Leos tolerated the infusion, and had no complaints. VS remained stable. PIV flushed with NS 10 mL and catheter removed intact. No bleeding, hematoma or other evidence of complication noted at site. Gauze dressing applied to site and wrapped with coban. Patient armband removed and shredded. Ms. Sidra Leos was discharged from [place] [address] in stable condition at 1100. She is to return to the [facility] on 2020 at 0900 for her next infusion of Tysabri.       Constance Grider  2020

## 2020-11-19 ENCOUNTER — HOSPITAL ENCOUNTER (OUTPATIENT)
Dept: INFUSION THERAPY | Age: 30
Discharge: HOME | End: 2020-11-19
Payer: MEDICARE

## 2020-11-19 VITALS
SYSTOLIC BLOOD PRESSURE: 113 MMHG | RESPIRATION RATE: 16 BRPM | TEMPERATURE: 98.3 F | DIASTOLIC BLOOD PRESSURE: 59 MMHG | HEART RATE: 92 BPM | OXYGEN SATURATION: 98 %

## 2020-11-19 DIAGNOSIS — G35 MULTIPLE SCLEROSIS (HCC): Primary | ICD-10-CM

## 2020-11-19 DIAGNOSIS — G35 MULTIPLE SCLEROSIS (HCC): ICD-10-CM

## 2020-11-19 PROCEDURE — 96365 THER/PROPH/DIAG IV INF INIT: CPT

## 2020-11-19 PROCEDURE — 74011250636 HC RX REV CODE- 250/636

## 2020-11-19 PROCEDURE — 96413 CHEMO IV INFUSION 1 HR: CPT

## 2020-11-19 PROCEDURE — 74011000258 HC RX REV CODE- 258

## 2020-11-19 RX ORDER — SODIUM CHLORIDE 0.9 % (FLUSH) 0.9 %
10 SYRINGE (ML) INJECTION AS NEEDED
Status: CANCELLED | OUTPATIENT
Start: 2020-12-17

## 2020-11-19 RX ORDER — SODIUM CHLORIDE 9 MG/ML
10 INJECTION INTRAMUSCULAR; INTRAVENOUS; SUBCUTANEOUS AS NEEDED
Status: ACTIVE | OUTPATIENT
Start: 2020-11-19 | End: 2020-11-19

## 2020-11-19 RX ORDER — DIPHENHYDRAMINE HYDROCHLORIDE 50 MG/ML
50 INJECTION, SOLUTION INTRAMUSCULAR; INTRAVENOUS AS NEEDED
Status: CANCELLED
Start: 2020-12-17

## 2020-11-19 RX ORDER — DIPHENHYDRAMINE HYDROCHLORIDE 50 MG/ML
25 INJECTION, SOLUTION INTRAMUSCULAR; INTRAVENOUS AS NEEDED
Status: CANCELLED
Start: 2020-12-17

## 2020-11-19 RX ORDER — EPINEPHRINE 1 MG/ML
0.3 INJECTION, SOLUTION, CONCENTRATE INTRAVENOUS AS NEEDED
Status: CANCELLED | OUTPATIENT
Start: 2020-12-17

## 2020-11-19 RX ORDER — HEPARIN 100 UNIT/ML
300-500 SYRINGE INTRAVENOUS AS NEEDED
Status: CANCELLED
Start: 2020-12-17

## 2020-11-19 RX ORDER — SODIUM CHLORIDE 9 MG/ML
25 INJECTION, SOLUTION INTRAVENOUS CONTINUOUS
Status: CANCELLED | OUTPATIENT
Start: 2020-12-17

## 2020-11-19 RX ORDER — ALBUTEROL SULFATE 0.83 MG/ML
2.5 SOLUTION RESPIRATORY (INHALATION) AS NEEDED
Status: CANCELLED
Start: 2020-12-17

## 2020-11-19 RX ORDER — ACETAMINOPHEN 325 MG/1
650 TABLET ORAL AS NEEDED
Status: CANCELLED
Start: 2020-12-17

## 2020-11-19 RX ORDER — ONDANSETRON 2 MG/ML
8 INJECTION INTRAMUSCULAR; INTRAVENOUS AS NEEDED
Status: CANCELLED | OUTPATIENT
Start: 2020-12-17

## 2020-11-19 RX ORDER — HYDROCORTISONE SODIUM SUCCINATE 100 MG/2ML
100 INJECTION, POWDER, FOR SOLUTION INTRAMUSCULAR; INTRAVENOUS AS NEEDED
Status: CANCELLED | OUTPATIENT
Start: 2020-12-17

## 2020-11-19 RX ORDER — SODIUM CHLORIDE 9 MG/ML
10 INJECTION INTRAMUSCULAR; INTRAVENOUS; SUBCUTANEOUS AS NEEDED
Status: CANCELLED | OUTPATIENT
Start: 2020-12-17

## 2020-11-19 RX ORDER — SODIUM CHLORIDE 9 MG/ML
25 INJECTION, SOLUTION INTRAVENOUS CONTINUOUS
Status: DISPENSED | OUTPATIENT
Start: 2020-11-19 | End: 2020-11-19

## 2020-11-19 RX ADMIN — SODIUM CHLORIDE 10 ML: 9 INJECTION INTRAMUSCULAR; INTRAVENOUS; SUBCUTANEOUS at 09:56

## 2020-11-19 RX ADMIN — SODIUM CHLORIDE 25 ML/HR: 9 INJECTION, SOLUTION INTRAVENOUS at 10:00

## 2020-11-19 RX ADMIN — NATALIZUMAB 300 MG: 300 INJECTION INTRAVENOUS at 10:10

## 2020-12-17 ENCOUNTER — HOSPITAL ENCOUNTER (OUTPATIENT)
Dept: INFUSION THERAPY | Age: 30
Discharge: HOME | End: 2020-12-17
Payer: MEDICARE

## 2020-12-17 VITALS
OXYGEN SATURATION: 95 % | RESPIRATION RATE: 16 BRPM | TEMPERATURE: 97.8 F | HEART RATE: 84 BPM | SYSTOLIC BLOOD PRESSURE: 99 MMHG | DIASTOLIC BLOOD PRESSURE: 57 MMHG

## 2020-12-17 DIAGNOSIS — G35 MULTIPLE SCLEROSIS (HCC): Primary | ICD-10-CM

## 2020-12-17 LAB
ALBUMIN SERPL-MCNC: 3 G/DL (ref 3.4–5)
ALBUMIN/GLOB SERPL: 0.8 {RATIO} (ref 0.8–1.7)
ALP SERPL-CCNC: 55 U/L (ref 45–117)
ALT SERPL-CCNC: 23 U/L (ref 13–56)
ANION GAP SERPL CALC-SCNC: 8 MMOL/L (ref 3–18)
AST SERPL-CCNC: 11 U/L (ref 10–38)
BASOPHILS # BLD: 0 K/UL (ref 0–0.1)
BASOPHILS NFR BLD: 0 % (ref 0–2)
BILIRUB SERPL-MCNC: 0.4 MG/DL (ref 0.2–1)
BUN SERPL-MCNC: 11 MG/DL (ref 7–18)
BUN/CREAT SERPL: 19 (ref 12–20)
CALCIUM SERPL-MCNC: 8.5 MG/DL (ref 8.5–10.1)
CHLORIDE SERPL-SCNC: 109 MMOL/L (ref 100–111)
CO2 SERPL-SCNC: 24 MMOL/L (ref 21–32)
CREAT SERPL-MCNC: 0.59 MG/DL (ref 0.6–1.3)
DIFFERENTIAL METHOD BLD: ABNORMAL
EOSINOPHIL # BLD: 0.4 K/UL (ref 0–0.4)
EOSINOPHIL NFR BLD: 4 % (ref 0–5)
ERYTHROCYTE [DISTWIDTH] IN BLOOD BY AUTOMATED COUNT: 13.8 % (ref 11.6–14.5)
GLOBULIN SER CALC-MCNC: 3.6 G/DL (ref 2–4)
GLUCOSE SERPL-MCNC: 82 MG/DL (ref 74–99)
HCT VFR BLD AUTO: 28.3 % (ref 35–45)
HGB BLD-MCNC: 9.5 G/DL (ref 12–16)
LYMPHOCYTES # BLD: 4 K/UL (ref 0.9–3.6)
LYMPHOCYTES NFR BLD: 38 % (ref 21–52)
MCH RBC QN AUTO: 29.5 PG (ref 24–34)
MCHC RBC AUTO-ENTMCNC: 33.6 G/DL (ref 31–37)
MCV RBC AUTO: 87.9 FL (ref 74–97)
MONOCYTES # BLD: 0.7 K/UL (ref 0.05–1.2)
MONOCYTES NFR BLD: 7 % (ref 3–10)
NEUTS SEG # BLD: 5.3 K/UL (ref 1.8–8)
NEUTS SEG NFR BLD: 51 % (ref 40–73)
PLATELET # BLD AUTO: 308 K/UL (ref 135–420)
PMV BLD AUTO: 9.8 FL (ref 9.2–11.8)
POTASSIUM SERPL-SCNC: 4 MMOL/L (ref 3.5–5.5)
PROT SERPL-MCNC: 6.6 G/DL (ref 6.4–8.2)
RBC # BLD AUTO: 3.22 M/UL (ref 4.2–5.3)
SODIUM SERPL-SCNC: 141 MMOL/L (ref 136–145)
WBC # BLD AUTO: 10.4 K/UL (ref 4.6–13.2)

## 2020-12-17 PROCEDURE — 80053 COMPREHEN METABOLIC PANEL: CPT

## 2020-12-17 PROCEDURE — 85025 COMPLETE CBC W/AUTO DIFF WBC: CPT

## 2020-12-17 PROCEDURE — 74011250636 HC RX REV CODE- 250/636

## 2020-12-17 PROCEDURE — 74011000258 HC RX REV CODE- 258

## 2020-12-17 PROCEDURE — 96365 THER/PROPH/DIAG IV INF INIT: CPT

## 2020-12-17 PROCEDURE — 96413 CHEMO IV INFUSION 1 HR: CPT

## 2020-12-17 RX ORDER — HEPARIN 100 UNIT/ML
300-500 SYRINGE INTRAVENOUS AS NEEDED
Status: CANCELLED
Start: 2021-01-14

## 2020-12-17 RX ORDER — SODIUM CHLORIDE 0.9 % (FLUSH) 0.9 %
10 SYRINGE (ML) INJECTION AS NEEDED
Status: CANCELLED | OUTPATIENT
Start: 2021-01-14

## 2020-12-17 RX ORDER — SODIUM CHLORIDE 9 MG/ML
25 INJECTION, SOLUTION INTRAVENOUS CONTINUOUS
Status: DISPENSED | OUTPATIENT
Start: 2020-12-17 | End: 2020-12-17

## 2020-12-17 RX ORDER — DIPHENHYDRAMINE HYDROCHLORIDE 50 MG/ML
50 INJECTION, SOLUTION INTRAMUSCULAR; INTRAVENOUS AS NEEDED
Status: CANCELLED
Start: 2021-01-14

## 2020-12-17 RX ORDER — ALBUTEROL SULFATE 0.83 MG/ML
2.5 SOLUTION RESPIRATORY (INHALATION) AS NEEDED
Status: CANCELLED
Start: 2021-01-14

## 2020-12-17 RX ORDER — SODIUM CHLORIDE 9 MG/ML
25 INJECTION, SOLUTION INTRAVENOUS CONTINUOUS
Status: CANCELLED | OUTPATIENT
Start: 2021-01-14

## 2020-12-17 RX ORDER — ONDANSETRON 2 MG/ML
8 INJECTION INTRAMUSCULAR; INTRAVENOUS AS NEEDED
Status: CANCELLED | OUTPATIENT
Start: 2021-01-14

## 2020-12-17 RX ORDER — SODIUM CHLORIDE 9 MG/ML
10 INJECTION INTRAMUSCULAR; INTRAVENOUS; SUBCUTANEOUS AS NEEDED
Status: CANCELLED | OUTPATIENT
Start: 2021-01-14

## 2020-12-17 RX ORDER — DIPHENHYDRAMINE HYDROCHLORIDE 50 MG/ML
25 INJECTION, SOLUTION INTRAMUSCULAR; INTRAVENOUS AS NEEDED
Status: CANCELLED
Start: 2021-01-14

## 2020-12-17 RX ORDER — HYDROCORTISONE SODIUM SUCCINATE 100 MG/2ML
100 INJECTION, POWDER, FOR SOLUTION INTRAMUSCULAR; INTRAVENOUS AS NEEDED
Status: CANCELLED | OUTPATIENT
Start: 2021-01-14

## 2020-12-17 RX ORDER — EPINEPHRINE 1 MG/ML
0.3 INJECTION, SOLUTION, CONCENTRATE INTRAVENOUS AS NEEDED
Status: CANCELLED | OUTPATIENT
Start: 2021-01-14

## 2020-12-17 RX ORDER — ACETAMINOPHEN 325 MG/1
650 TABLET ORAL AS NEEDED
Status: CANCELLED
Start: 2021-01-14

## 2020-12-17 RX ADMIN — SODIUM CHLORIDE 25 ML/HR: 900 INJECTION, SOLUTION INTRAVENOUS at 09:38

## 2020-12-17 RX ADMIN — NATALIZUMAB 300 MG: 300 INJECTION INTRAVENOUS at 09:57

## 2020-12-17 NOTE — PROGRESS NOTES
[facility] Progress Note    Date: 2020    Name: Natali Purcell    MRN: [id number]         : 1990    Natalaizumab (TYSABRI) Infusion    Ms. Trini Garcia to [facility], ambulatory, at 3961 with walker. Pt was assessed and education was provided. Ms. Peter Ye vitals were reviewed and patient was observed for 5 minutes prior to treatment. Patient denied any adverse effects of prior infusions of Tysabri. Visit Vitals  /64 (BP 1 Location: Left arm, BP Patient Position: Sitting)   Pulse (!) 110   Temp 97.8 °F (36.6 °C)   Resp 16   SpO2 95%   Breastfeeding No       24 g PIV placed in patient's left AC x 1 attempt. PIV flushed easily and had brisk blood return. CBC and CMP drawn with PIV start and sent out for processing. TYSABRI 300 mg IV was initiated @ 125 mL/hr, via peripheral IV line to infuse over 1 hour, as ordered. VS stable and pt denied complaints of itching, lip/tongue/facial swelling, SOB, CP or other complaints. IV site without evidence of infiltration or irritation. Ms. Trini Garcia tolerated the infusion, and had no complaints. VS remained stable. Patient Vitals for the past 12 hrs:   Temp Pulse Resp BP SpO2   20 1056 -- 84 -- (!) 99/57 --   20 0920 97.8 °F (36.6 °C) (!) 110 16 124/64 95 %       Ms Lana Arteaga declined to stayed for 60 min observation after infusion. PIV flushed with NS 10 mL and catheter removed intact. No bleeding, hematoma or other evidence of complication noted at site. Gauze dressing applied to site and wrapped with coban. Patient armband removed and shredded. Ms. Trini Garcia was discharged from [place] [address] in stable condition at 1100. She is to return to the [facility] on 21 at 0900 for her next infusion of Tysabri.       Chuyita Blanco RN  2020

## 2021-01-14 ENCOUNTER — HOSPITAL ENCOUNTER (OUTPATIENT)
Dept: INFUSION THERAPY | Age: 31
Discharge: HOME | End: 2021-01-14
Payer: MEDICARE

## 2021-01-14 VITALS
DIASTOLIC BLOOD PRESSURE: 63 MMHG | TEMPERATURE: 98.5 F | SYSTOLIC BLOOD PRESSURE: 100 MMHG | OXYGEN SATURATION: 97 % | HEART RATE: 112 BPM | RESPIRATION RATE: 18 BRPM

## 2021-01-14 DIAGNOSIS — G35 MULTIPLE SCLEROSIS (HCC): Primary | ICD-10-CM

## 2021-01-14 PROCEDURE — 96365 THER/PROPH/DIAG IV INF INIT: CPT

## 2021-01-14 PROCEDURE — 74011250636 HC RX REV CODE- 250/636

## 2021-01-14 PROCEDURE — 74011000258 HC RX REV CODE- 258

## 2021-01-14 RX ORDER — EPINEPHRINE 1 MG/ML
0.3 INJECTION, SOLUTION, CONCENTRATE INTRAVENOUS AS NEEDED
Status: CANCELLED | OUTPATIENT
Start: 2021-02-11

## 2021-01-14 RX ORDER — ACETAMINOPHEN 325 MG/1
650 TABLET ORAL AS NEEDED
Status: CANCELLED
Start: 2021-02-11

## 2021-01-14 RX ORDER — SODIUM CHLORIDE 9 MG/ML
25 INJECTION, SOLUTION INTRAVENOUS CONTINUOUS
Status: DISPENSED | OUTPATIENT
Start: 2021-01-14 | End: 2021-01-14

## 2021-01-14 RX ORDER — SODIUM CHLORIDE 0.9 % (FLUSH) 0.9 %
10 SYRINGE (ML) INJECTION AS NEEDED
Status: CANCELLED | OUTPATIENT
Start: 2021-02-11

## 2021-01-14 RX ORDER — ALBUTEROL SULFATE 0.83 MG/ML
2.5 SOLUTION RESPIRATORY (INHALATION) AS NEEDED
Status: CANCELLED
Start: 2021-02-11

## 2021-01-14 RX ORDER — ONDANSETRON 2 MG/ML
8 INJECTION INTRAMUSCULAR; INTRAVENOUS AS NEEDED
Status: CANCELLED | OUTPATIENT
Start: 2021-02-11

## 2021-01-14 RX ORDER — DIPHENHYDRAMINE HYDROCHLORIDE 50 MG/ML
25 INJECTION, SOLUTION INTRAMUSCULAR; INTRAVENOUS AS NEEDED
Status: CANCELLED
Start: 2021-02-11

## 2021-01-14 RX ORDER — SODIUM CHLORIDE 9 MG/ML
25 INJECTION, SOLUTION INTRAVENOUS CONTINUOUS
Status: CANCELLED | OUTPATIENT
Start: 2021-02-11

## 2021-01-14 RX ORDER — HYDROCORTISONE SODIUM SUCCINATE 100 MG/2ML
100 INJECTION, POWDER, FOR SOLUTION INTRAMUSCULAR; INTRAVENOUS AS NEEDED
Status: CANCELLED | OUTPATIENT
Start: 2021-02-11

## 2021-01-14 RX ORDER — HEPARIN 100 UNIT/ML
300-500 SYRINGE INTRAVENOUS AS NEEDED
Status: CANCELLED
Start: 2021-02-11

## 2021-01-14 RX ORDER — DIPHENHYDRAMINE HYDROCHLORIDE 50 MG/ML
50 INJECTION, SOLUTION INTRAMUSCULAR; INTRAVENOUS AS NEEDED
Status: CANCELLED
Start: 2021-02-11

## 2021-01-14 RX ORDER — SODIUM CHLORIDE 9 MG/ML
10 INJECTION INTRAMUSCULAR; INTRAVENOUS; SUBCUTANEOUS AS NEEDED
Status: CANCELLED | OUTPATIENT
Start: 2021-02-11

## 2021-01-14 RX ADMIN — NATALIZUMAB 300 MG: 300 INJECTION INTRAVENOUS at 09:24

## 2021-03-04 RX ORDER — SODIUM CHLORIDE 9 MG/ML
25 INJECTION, SOLUTION INTRAVENOUS CONTINUOUS
Status: CANCELLED | OUTPATIENT
Start: 2021-03-04

## 2021-03-11 ENCOUNTER — HOSPITAL ENCOUNTER (OUTPATIENT)
Dept: INFUSION THERAPY | Age: 31
End: 2021-03-11

## 2021-03-18 ENCOUNTER — HOSPITAL ENCOUNTER (OUTPATIENT)
Dept: INFUSION THERAPY | Age: 31
Discharge: HOME | End: 2021-03-18
Payer: MEDICARE

## 2021-03-18 VITALS
TEMPERATURE: 97 F | SYSTOLIC BLOOD PRESSURE: 110 MMHG | OXYGEN SATURATION: 99 % | RESPIRATION RATE: 18 BRPM | DIASTOLIC BLOOD PRESSURE: 71 MMHG | HEART RATE: 79 BPM

## 2021-03-18 DIAGNOSIS — G35 MULTIPLE SCLEROSIS (HCC): Primary | ICD-10-CM

## 2021-03-18 LAB
ALBUMIN SERPL-MCNC: 3.3 G/DL (ref 3.4–5)
ALBUMIN/GLOB SERPL: 0.9 {RATIO} (ref 0.8–1.7)
ALP SERPL-CCNC: 66 U/L (ref 45–117)
ALT SERPL-CCNC: 14 U/L (ref 13–56)
ANION GAP SERPL CALC-SCNC: 5 MMOL/L (ref 3–18)
AST SERPL-CCNC: 14 U/L (ref 10–38)
BASOPHILS # BLD: 0 K/UL (ref 0–0.1)
BASOPHILS NFR BLD: 0 % (ref 0–2)
BILIRUB SERPL-MCNC: 0.2 MG/DL (ref 0.2–1)
BUN SERPL-MCNC: 7 MG/DL (ref 7–18)
BUN/CREAT SERPL: 11 (ref 12–20)
CALCIUM SERPL-MCNC: 8.2 MG/DL (ref 8.5–10.1)
CHLORIDE SERPL-SCNC: 109 MMOL/L (ref 100–111)
CO2 SERPL-SCNC: 30 MMOL/L (ref 21–32)
CREAT SERPL-MCNC: 0.64 MG/DL (ref 0.6–1.3)
DIFFERENTIAL METHOD BLD: ABNORMAL
EOSINOPHIL # BLD: 0.2 K/UL (ref 0–0.4)
EOSINOPHIL NFR BLD: 3 % (ref 0–5)
ERYTHROCYTE [DISTWIDTH] IN BLOOD BY AUTOMATED COUNT: 13.9 % (ref 11.6–14.5)
GLOBULIN SER CALC-MCNC: 3.5 G/DL (ref 2–4)
GLUCOSE SERPL-MCNC: 79 MG/DL (ref 74–99)
HCT VFR BLD AUTO: 32.5 % (ref 35–45)
HGB BLD-MCNC: 10.5 G/DL (ref 12–16)
LYMPHOCYTES # BLD: 2 K/UL (ref 0.9–3.6)
LYMPHOCYTES NFR BLD: 35 % (ref 21–52)
MCH RBC QN AUTO: 30.3 PG (ref 24–34)
MCHC RBC AUTO-ENTMCNC: 32.3 G/DL (ref 31–37)
MCV RBC AUTO: 93.9 FL (ref 74–97)
MONOCYTES # BLD: 0.3 K/UL (ref 0.05–1.2)
MONOCYTES NFR BLD: 6 % (ref 3–10)
NEUTS SEG # BLD: 3.3 K/UL (ref 1.8–8)
NEUTS SEG NFR BLD: 56 % (ref 40–73)
PLATELET # BLD AUTO: 311 K/UL (ref 135–420)
PMV BLD AUTO: 9.9 FL (ref 9.2–11.8)
POTASSIUM SERPL-SCNC: 3.8 MMOL/L (ref 3.5–5.5)
PROT SERPL-MCNC: 6.8 G/DL (ref 6.4–8.2)
RBC # BLD AUTO: 3.46 M/UL (ref 4.2–5.3)
SODIUM SERPL-SCNC: 144 MMOL/L (ref 136–145)
WBC # BLD AUTO: 5.8 K/UL (ref 4.6–13.2)

## 2021-03-18 PROCEDURE — 96365 THER/PROPH/DIAG IV INF INIT: CPT

## 2021-03-18 PROCEDURE — 74011000258 HC RX REV CODE- 258: Performed by: PSYCHIATRY & NEUROLOGY

## 2021-03-18 PROCEDURE — 80053 COMPREHEN METABOLIC PANEL: CPT

## 2021-03-18 PROCEDURE — 74011250636 HC RX REV CODE- 250/636: Performed by: PSYCHIATRY & NEUROLOGY

## 2021-03-18 PROCEDURE — 85025 COMPLETE CBC W/AUTO DIFF WBC: CPT

## 2021-03-18 RX ORDER — EPINEPHRINE 1 MG/ML
0.3 INJECTION, SOLUTION, CONCENTRATE INTRAVENOUS AS NEEDED
Status: CANCELLED | OUTPATIENT
Start: 2021-04-08

## 2021-03-18 RX ORDER — SODIUM CHLORIDE 0.9 % (FLUSH) 0.9 %
10 SYRINGE (ML) INJECTION AS NEEDED
Status: CANCELLED | OUTPATIENT
Start: 2021-04-08

## 2021-03-18 RX ORDER — DIPHENHYDRAMINE HYDROCHLORIDE 50 MG/ML
25 INJECTION, SOLUTION INTRAMUSCULAR; INTRAVENOUS AS NEEDED
Status: CANCELLED
Start: 2021-04-08

## 2021-03-18 RX ORDER — SODIUM CHLORIDE 9 MG/ML
25 INJECTION, SOLUTION INTRAVENOUS CONTINUOUS
Status: CANCELLED | OUTPATIENT
Start: 2021-04-08

## 2021-03-18 RX ORDER — SODIUM CHLORIDE 9 MG/ML
10 INJECTION INTRAMUSCULAR; INTRAVENOUS; SUBCUTANEOUS AS NEEDED
Status: CANCELLED | OUTPATIENT
Start: 2021-04-08

## 2021-03-18 RX ORDER — DIPHENHYDRAMINE HYDROCHLORIDE 50 MG/ML
50 INJECTION, SOLUTION INTRAMUSCULAR; INTRAVENOUS AS NEEDED
Status: CANCELLED
Start: 2021-04-08

## 2021-03-18 RX ORDER — HEPARIN 100 UNIT/ML
300-500 SYRINGE INTRAVENOUS AS NEEDED
Status: CANCELLED
Start: 2021-04-08

## 2021-03-18 RX ORDER — ALBUTEROL SULFATE 0.83 MG/ML
2.5 SOLUTION RESPIRATORY (INHALATION) AS NEEDED
Status: CANCELLED
Start: 2021-04-08

## 2021-03-18 RX ORDER — ACETAMINOPHEN 325 MG/1
650 TABLET ORAL AS NEEDED
Status: CANCELLED
Start: 2021-04-08

## 2021-03-18 RX ORDER — SODIUM CHLORIDE 0.9 % (FLUSH) 0.9 %
10 SYRINGE (ML) INJECTION AS NEEDED
Status: DISPENSED | OUTPATIENT
Start: 2021-03-18 | End: 2021-03-18

## 2021-03-18 RX ORDER — HYDROCORTISONE SODIUM SUCCINATE 100 MG/2ML
100 INJECTION, POWDER, FOR SOLUTION INTRAMUSCULAR; INTRAVENOUS AS NEEDED
Status: CANCELLED | OUTPATIENT
Start: 2021-04-08

## 2021-03-18 RX ORDER — SODIUM CHLORIDE 9 MG/ML
25 INJECTION, SOLUTION INTRAVENOUS CONTINUOUS
Status: DISPENSED | OUTPATIENT
Start: 2021-03-18 | End: 2021-03-18

## 2021-03-18 RX ORDER — ONDANSETRON 2 MG/ML
8 INJECTION INTRAMUSCULAR; INTRAVENOUS AS NEEDED
Status: CANCELLED | OUTPATIENT
Start: 2021-04-08

## 2021-03-18 RX ADMIN — Medication 10 ML: at 11:08

## 2021-03-18 RX ADMIN — SODIUM CHLORIDE 25 ML/HR: 900 INJECTION, SOLUTION INTRAVENOUS at 09:41

## 2021-03-18 RX ADMIN — NATALIZUMAB 300 MG: 300 INJECTION INTRAVENOUS at 10:11

## 2021-03-18 NOTE — PROGRESS NOTES
[facility] Progress Note    Date: 2021    Name: Rik Brooks    MRN: [id number]         : 1990    Natalaizumab (TYSABRI) Infusion    Ms. Kieran Laurent to [facility], ambulatory, at 4390 with walker. Pt was assessed and education was provided. Ms. Dion Willis vitals were reviewed and patient was observed for 5 minutes prior to treatment. Patient denied any adverse effects of prior infusions of Tysabri. Visit Vitals  /71 (BP 1 Location: Right upper arm, BP Patient Position: Sitting)   Pulse 79   Temp 97 °F (36.1 °C)   Resp 18   SpO2 99%   Breastfeeding No       24 g PIV placed in patient's left AC x 1 attempt. PIV flushed easily and had brisk blood return. CBC and CMP drawn with PIV start and sent out for processing. TYSABRI 300 mg IV was initiated @ 125 mL/hr, via peripheral IV line to infuse over 1 hour, as ordered. VS stable and pt denied complaints of itching, lip/tongue/facial swelling, SOB, CP or other complaints. IV site without evidence of infiltration or irritation. Ms. Kieran Laurent tolerated the infusion, and had no complaints. VS remained stable. Patient Vitals for the past 12 hrs:   Temp Pulse Resp BP SpO2   21 0933 97 °F (36.1 °C) 79 18 110/71 99 %       Ms Carolyn Harrell declined to stayed for 60 min observation after infusion. PIV flushed with NS 10 mL and catheter removed intact. No bleeding, hematoma or other evidence of complication noted at site. Gauze dressing applied to site and wrapped with coban. Patient armband removed and shredded. Ms. Kieran Laurent was discharged from [place] [address] in stable condition at 1120. She is to return to the [facility] on 4/15/21 at 0900 for her next infusion of Tysabri.       Sawyer James RN  2021

## 2021-04-08 ENCOUNTER — APPOINTMENT (OUTPATIENT)
Dept: INFUSION THERAPY | Age: 31
End: 2021-04-08

## 2021-05-13 ENCOUNTER — HOSPITAL ENCOUNTER (OUTPATIENT)
Dept: INFUSION THERAPY | Age: 31
Discharge: HOME | End: 2021-05-13
Payer: MEDICARE

## 2021-05-13 VITALS
DIASTOLIC BLOOD PRESSURE: 72 MMHG | RESPIRATION RATE: 18 BRPM | SYSTOLIC BLOOD PRESSURE: 115 MMHG | HEART RATE: 97 BPM | OXYGEN SATURATION: 98 %

## 2021-05-13 DIAGNOSIS — G35 MULTIPLE SCLEROSIS (HCC): Primary | ICD-10-CM

## 2021-05-13 PROCEDURE — 74011250636 HC RX REV CODE- 250/636: Performed by: PSYCHIATRY & NEUROLOGY

## 2021-05-13 PROCEDURE — 74011000258 HC RX REV CODE- 258: Performed by: PSYCHIATRY & NEUROLOGY

## 2021-05-13 PROCEDURE — 96365 THER/PROPH/DIAG IV INF INIT: CPT

## 2021-05-13 RX ORDER — ALBUTEROL SULFATE 0.83 MG/ML
2.5 SOLUTION RESPIRATORY (INHALATION) AS NEEDED
Status: CANCELLED
Start: 2021-06-03

## 2021-05-13 RX ORDER — ONDANSETRON 2 MG/ML
8 INJECTION INTRAMUSCULAR; INTRAVENOUS AS NEEDED
Status: CANCELLED | OUTPATIENT
Start: 2021-06-03

## 2021-05-13 RX ORDER — DIPHENHYDRAMINE HYDROCHLORIDE 50 MG/ML
50 INJECTION, SOLUTION INTRAMUSCULAR; INTRAVENOUS AS NEEDED
Status: CANCELLED
Start: 2021-06-03

## 2021-05-13 RX ORDER — ACETAMINOPHEN 325 MG/1
650 TABLET ORAL AS NEEDED
Status: CANCELLED
Start: 2021-06-03

## 2021-05-13 RX ORDER — EPINEPHRINE 1 MG/ML
0.3 INJECTION, SOLUTION, CONCENTRATE INTRAVENOUS AS NEEDED
Status: CANCELLED | OUTPATIENT
Start: 2021-06-03

## 2021-05-13 RX ORDER — SODIUM CHLORIDE 9 MG/ML
10 INJECTION INTRAMUSCULAR; INTRAVENOUS; SUBCUTANEOUS AS NEEDED
Status: CANCELLED | OUTPATIENT
Start: 2021-06-03

## 2021-05-13 RX ORDER — HEPARIN 100 UNIT/ML
300-500 SYRINGE INTRAVENOUS AS NEEDED
Status: CANCELLED
Start: 2021-06-03

## 2021-05-13 RX ORDER — HYDROCORTISONE SODIUM SUCCINATE 100 MG/2ML
100 INJECTION, POWDER, FOR SOLUTION INTRAMUSCULAR; INTRAVENOUS AS NEEDED
Status: CANCELLED | OUTPATIENT
Start: 2021-06-03

## 2021-05-13 RX ORDER — SODIUM CHLORIDE 9 MG/ML
25 INJECTION, SOLUTION INTRAVENOUS CONTINUOUS
Status: DISPENSED | OUTPATIENT
Start: 2021-05-13 | End: 2021-05-13

## 2021-05-13 RX ORDER — SODIUM CHLORIDE 0.9 % (FLUSH) 0.9 %
10 SYRINGE (ML) INJECTION AS NEEDED
Status: CANCELLED | OUTPATIENT
Start: 2021-06-03

## 2021-05-13 RX ORDER — SODIUM CHLORIDE 9 MG/ML
25 INJECTION, SOLUTION INTRAVENOUS CONTINUOUS
Status: CANCELLED | OUTPATIENT
Start: 2021-06-03

## 2021-05-13 RX ORDER — DIPHENHYDRAMINE HYDROCHLORIDE 50 MG/ML
25 INJECTION, SOLUTION INTRAMUSCULAR; INTRAVENOUS AS NEEDED
Status: CANCELLED
Start: 2021-06-03

## 2021-05-13 RX ORDER — SODIUM CHLORIDE 0.9 % (FLUSH) 0.9 %
10 SYRINGE (ML) INJECTION AS NEEDED
Status: DISPENSED | OUTPATIENT
Start: 2021-05-13 | End: 2021-05-13

## 2021-05-13 RX ADMIN — SODIUM CHLORIDE 25 ML/HR: 900 INJECTION, SOLUTION INTRAVENOUS at 09:50

## 2021-05-13 RX ADMIN — Medication 10 ML: at 11:15

## 2021-05-13 RX ADMIN — NATALIZUMAB 300 MG: 300 INJECTION INTRAVENOUS at 10:11

## 2021-05-13 NOTE — PROGRESS NOTES
[facility] Progress Note    Date: May 13, 2021    Name: Karol Maynard    MRN: [id number]         : 1990    Natalaizumab (TYSABRI) Infusion    Ms. Dom Weiss to [facility], ambulatory, at 0930 with walker. Pt was assessed and education was provided. Ms. Shamir Duffy vitals were reviewed and patient was observed for 5 minutes prior to treatment. Patient denied any adverse effects of prior infusions of Tysabri. Visit Vitals  /72 (BP 1 Location: Left upper arm, BP Patient Position: Sitting)   Pulse 97   Resp 18   SpO2 98%   Breastfeeding No       24 g PIV placed in patient's left AC x 1 attempt. PIV flushed easily and had brisk blood return. TYSABRI 300 mg IV was initiated @ 125 mL/hr, via peripheral IV line to infuse over 1 hour, as ordered. VS stable and pt denied complaints of itching, lip/tongue/facial swelling, SOB, CP or other complaints. IV site without evidence of infiltration or irritation. Ms. Dom Weiss tolerated the infusion, and had no complaints. VS remained stable. Patient Vitals for the past 12 hrs:   Pulse Resp BP SpO2   21 0942 97 18 115/72 98 %       Ms Diego Wise declined to stayed for 60 min observation after infusion. PIV flushed with NS 10 mL and catheter removed intact. No bleeding, hematoma or other evidence of complication noted at site. Gauze dressing applied to site and wrapped with coban. Patient armband removed and shredded. Ms. Dom Weiss was discharged from [place] [address] in stable condition at 1130. She is to return to the [facility] on 6/10/21 at 0900 for her next infusion of Tysabri.       Gabe Ho RN  May 13, 2021

## 2021-06-10 ENCOUNTER — HOSPITAL ENCOUNTER (OUTPATIENT)
Dept: INFUSION THERAPY | Age: 31
End: 2021-06-10

## 2021-06-17 ENCOUNTER — HOSPITAL ENCOUNTER (OUTPATIENT)
Dept: INFUSION THERAPY | Age: 31
Discharge: HOME | End: 2021-06-17
Payer: MEDICARE

## 2021-06-17 VITALS
OXYGEN SATURATION: 100 % | RESPIRATION RATE: 16 BRPM | SYSTOLIC BLOOD PRESSURE: 115 MMHG | TEMPERATURE: 97.9 F | HEART RATE: 80 BPM | DIASTOLIC BLOOD PRESSURE: 69 MMHG

## 2021-06-17 DIAGNOSIS — G35 MULTIPLE SCLEROSIS (HCC): Primary | ICD-10-CM

## 2021-06-17 LAB
ALBUMIN SERPL-MCNC: 3.7 G/DL (ref 3.4–5)
ALBUMIN/GLOB SERPL: 0.9 {RATIO} (ref 0.8–1.7)
ALP SERPL-CCNC: 68 U/L (ref 45–117)
ALT SERPL-CCNC: 16 U/L (ref 13–56)
ANION GAP SERPL CALC-SCNC: 4 MMOL/L (ref 3–18)
AST SERPL-CCNC: 10 U/L (ref 10–38)
BASOPHILS # BLD: 0 K/UL (ref 0–0.1)
BASOPHILS NFR BLD: 1 % (ref 0–2)
BILIRUB SERPL-MCNC: 0.4 MG/DL (ref 0.2–1)
BUN SERPL-MCNC: 8 MG/DL (ref 7–18)
BUN/CREAT SERPL: 13 (ref 12–20)
CALCIUM SERPL-MCNC: 8.4 MG/DL (ref 8.5–10.1)
CHLORIDE SERPL-SCNC: 107 MMOL/L (ref 100–111)
CO2 SERPL-SCNC: 31 MMOL/L (ref 21–32)
CREAT SERPL-MCNC: 0.62 MG/DL (ref 0.6–1.3)
DIFFERENTIAL METHOD BLD: ABNORMAL
EOSINOPHIL # BLD: 0.3 K/UL (ref 0–0.4)
EOSINOPHIL NFR BLD: 3 % (ref 0–5)
ERYTHROCYTE [DISTWIDTH] IN BLOOD BY AUTOMATED COUNT: 13.5 % (ref 11.6–14.5)
GLOBULIN SER CALC-MCNC: 3.9 G/DL (ref 2–4)
GLUCOSE SERPL-MCNC: 88 MG/DL (ref 74–99)
HCT VFR BLD AUTO: 35 % (ref 35–45)
HGB BLD-MCNC: 11 G/DL (ref 12–16)
LYMPHOCYTES # BLD: 3 K/UL (ref 0.9–3.6)
LYMPHOCYTES NFR BLD: 38 % (ref 21–52)
MCH RBC QN AUTO: 27.9 PG (ref 24–34)
MCHC RBC AUTO-ENTMCNC: 31.4 G/DL (ref 31–37)
MCV RBC AUTO: 88.8 FL (ref 74–97)
MONOCYTES # BLD: 0.4 K/UL (ref 0.05–1.2)
MONOCYTES NFR BLD: 5 % (ref 3–10)
NEUTS SEG # BLD: 4.2 K/UL (ref 1.8–8)
NEUTS SEG NFR BLD: 53 % (ref 40–73)
PLATELET # BLD AUTO: 343 K/UL (ref 135–420)
PMV BLD AUTO: 10 FL (ref 9.2–11.8)
POTASSIUM SERPL-SCNC: 3.8 MMOL/L (ref 3.5–5.5)
PROT SERPL-MCNC: 7.6 G/DL (ref 6.4–8.2)
RBC # BLD AUTO: 3.94 M/UL (ref 4.2–5.3)
SODIUM SERPL-SCNC: 142 MMOL/L (ref 136–145)
WBC # BLD AUTO: 7.9 K/UL (ref 4.6–13.2)

## 2021-06-17 PROCEDURE — 85025 COMPLETE CBC W/AUTO DIFF WBC: CPT

## 2021-06-17 PROCEDURE — 74011250636 HC RX REV CODE- 250/636: Performed by: PSYCHIATRY & NEUROLOGY

## 2021-06-17 PROCEDURE — 96365 THER/PROPH/DIAG IV INF INIT: CPT

## 2021-06-17 PROCEDURE — 80053 COMPREHEN METABOLIC PANEL: CPT

## 2021-06-17 PROCEDURE — 74011000258 HC RX REV CODE- 258: Performed by: PSYCHIATRY & NEUROLOGY

## 2021-06-17 PROCEDURE — 87798 DETECT AGENT NOS DNA AMP: CPT

## 2021-06-17 RX ORDER — HYDROCORTISONE SODIUM SUCCINATE 100 MG/2ML
100 INJECTION, POWDER, FOR SOLUTION INTRAMUSCULAR; INTRAVENOUS AS NEEDED
Status: CANCELLED | OUTPATIENT
Start: 2021-07-01

## 2021-06-17 RX ORDER — HEPARIN 100 UNIT/ML
300-500 SYRINGE INTRAVENOUS AS NEEDED
Status: CANCELLED
Start: 2021-07-01

## 2021-06-17 RX ORDER — SODIUM CHLORIDE 9 MG/ML
10 INJECTION INTRAMUSCULAR; INTRAVENOUS; SUBCUTANEOUS AS NEEDED
Status: CANCELLED | OUTPATIENT
Start: 2021-07-01

## 2021-06-17 RX ORDER — EPINEPHRINE 1 MG/ML
0.3 INJECTION, SOLUTION, CONCENTRATE INTRAVENOUS AS NEEDED
Status: CANCELLED | OUTPATIENT
Start: 2021-07-01

## 2021-06-17 RX ORDER — SODIUM CHLORIDE 0.9 % (FLUSH) 0.9 %
10 SYRINGE (ML) INJECTION AS NEEDED
Status: DISPENSED | OUTPATIENT
Start: 2021-06-17 | End: 2021-06-17

## 2021-06-17 RX ORDER — ALBUTEROL SULFATE 0.83 MG/ML
2.5 SOLUTION RESPIRATORY (INHALATION) AS NEEDED
Status: CANCELLED
Start: 2021-07-01

## 2021-06-17 RX ORDER — SODIUM CHLORIDE 0.9 % (FLUSH) 0.9 %
10 SYRINGE (ML) INJECTION AS NEEDED
Status: CANCELLED | OUTPATIENT
Start: 2021-07-01

## 2021-06-17 RX ORDER — ACETAMINOPHEN 325 MG/1
650 TABLET ORAL AS NEEDED
Status: CANCELLED
Start: 2021-07-01

## 2021-06-17 RX ORDER — DIPHENHYDRAMINE HYDROCHLORIDE 50 MG/ML
50 INJECTION, SOLUTION INTRAMUSCULAR; INTRAVENOUS AS NEEDED
Status: CANCELLED
Start: 2021-07-01

## 2021-06-17 RX ORDER — SODIUM CHLORIDE 9 MG/ML
25 INJECTION, SOLUTION INTRAVENOUS CONTINUOUS
Status: DISPENSED | OUTPATIENT
Start: 2021-06-17 | End: 2021-06-17

## 2021-06-17 RX ORDER — ONDANSETRON 2 MG/ML
8 INJECTION INTRAMUSCULAR; INTRAVENOUS AS NEEDED
Status: CANCELLED | OUTPATIENT
Start: 2021-07-01

## 2021-06-17 RX ORDER — DIPHENHYDRAMINE HYDROCHLORIDE 50 MG/ML
25 INJECTION, SOLUTION INTRAMUSCULAR; INTRAVENOUS AS NEEDED
Status: CANCELLED
Start: 2021-07-01

## 2021-06-17 RX ORDER — SODIUM CHLORIDE 9 MG/ML
25 INJECTION, SOLUTION INTRAVENOUS CONTINUOUS
Status: CANCELLED | OUTPATIENT
Start: 2021-07-01

## 2021-06-17 RX ADMIN — Medication 10 ML: at 09:55

## 2021-06-17 RX ADMIN — SODIUM CHLORIDE 25 ML/HR: 9 INJECTION, SOLUTION INTRAVENOUS at 10:05

## 2021-06-17 RX ADMIN — NATALIZUMAB 300 MG: 300 INJECTION INTRAVENOUS at 10:55

## 2021-06-17 NOTE — PROGRESS NOTES
[facility] Progress Note    Date: 2021    Name: Roel Ferguson    MRN: [id number]         : 1990    Ms. Rebecca Santizo arrived in the [facility] today at 0930, in stable condition, here for her IV TYSABRI Infusion (Every 4 Weeks). She was assessed and education was provided. The TYSABRI Checklist was completed & faxed to the [facility] as required, and was also scanned into her electronic record. Ms. Rebecca Santizo met all requirements to receive the TYSABRI infusion today as ordered. Ms. Griselda Boucher vitals were reviewed. Visit Vitals  /79 (BP 1 Location: Right upper arm, BP Patient Position: Sitting)   Pulse 100   Temp 97 °F (36.1 °C)   Resp 16   SpO2 100%   Breastfeeding No         PIV # 25 G was established in her right AC at 0955 without incident, and brisk blood return was obtained. Blood for the ordered labs (CBC, CMP, & JESUS Virus Antibody) was drawn, and all labs were sent out to the [facility] lab for processing.  ml IV Bag was initiated to infuse @ KVO PRN throughout treatment today. Natalizumab (TYSABRI) 300 mg IV, was administered over approximately 1 hour, per order and without incident. After completion of the IV TYSABRI, her PIV was flushed well per policy with NS, and then was removed and gauze/coban was applied. Ms. Rebecca Santizo tolerated treatment very well today, and voiced no complaints. Ms. Rebecca Santizo was discharged from [place] [address] in stable condition at 1225. She is to return in 4 weeks, on Thursday, 7-15-21 at 0900,  for her next appointment, for her next TYSABRI Infusion.      Leland Leach RN  2021  10:18 AM

## 2021-06-20 LAB — JCPYV DNA SPEC QL NAA+PROBE: NEGATIVE

## 2021-07-08 ENCOUNTER — APPOINTMENT (OUTPATIENT)
Dept: INFUSION THERAPY | Age: 31
End: 2021-07-08
Payer: MEDICARE

## 2021-08-05 ENCOUNTER — HOSPITAL ENCOUNTER (OUTPATIENT)
Dept: INFUSION THERAPY | Age: 31
Discharge: HOME | End: 2021-08-05
Payer: MEDICARE

## 2021-08-05 VITALS
SYSTOLIC BLOOD PRESSURE: 117 MMHG | HEART RATE: 88 BPM | RESPIRATION RATE: 16 BRPM | OXYGEN SATURATION: 98 % | DIASTOLIC BLOOD PRESSURE: 75 MMHG | TEMPERATURE: 97.8 F

## 2021-08-05 DIAGNOSIS — G35 MULTIPLE SCLEROSIS (HCC): Primary | ICD-10-CM

## 2021-08-05 PROCEDURE — 74011250636 HC RX REV CODE- 250/636: Performed by: PSYCHIATRY & NEUROLOGY

## 2021-08-05 PROCEDURE — 96365 THER/PROPH/DIAG IV INF INIT: CPT

## 2021-08-05 PROCEDURE — 74011000258 HC RX REV CODE- 258: Performed by: PSYCHIATRY & NEUROLOGY

## 2021-08-05 RX ORDER — SODIUM CHLORIDE 0.9 % (FLUSH) 0.9 %
10 SYRINGE (ML) INJECTION AS NEEDED
Status: DISPENSED | OUTPATIENT
Start: 2021-08-05 | End: 2021-08-05

## 2021-08-05 RX ORDER — ALBUTEROL SULFATE 0.83 MG/ML
2.5 SOLUTION RESPIRATORY (INHALATION) AS NEEDED
Status: CANCELLED
Start: 2021-08-12

## 2021-08-05 RX ORDER — ONDANSETRON 2 MG/ML
8 INJECTION INTRAMUSCULAR; INTRAVENOUS AS NEEDED
Status: CANCELLED | OUTPATIENT
Start: 2021-08-12

## 2021-08-05 RX ORDER — SODIUM CHLORIDE 9 MG/ML
10 INJECTION INTRAMUSCULAR; INTRAVENOUS; SUBCUTANEOUS AS NEEDED
Status: CANCELLED | OUTPATIENT
Start: 2021-08-12

## 2021-08-05 RX ORDER — SODIUM CHLORIDE 9 MG/ML
25 INJECTION, SOLUTION INTRAVENOUS CONTINUOUS
Status: CANCELLED | OUTPATIENT
Start: 2021-08-12

## 2021-08-05 RX ORDER — DIPHENHYDRAMINE HYDROCHLORIDE 50 MG/ML
25 INJECTION, SOLUTION INTRAMUSCULAR; INTRAVENOUS AS NEEDED
Status: CANCELLED
Start: 2021-08-12

## 2021-08-05 RX ORDER — HYDROCORTISONE SODIUM SUCCINATE 100 MG/2ML
100 INJECTION, POWDER, FOR SOLUTION INTRAMUSCULAR; INTRAVENOUS AS NEEDED
Status: CANCELLED | OUTPATIENT
Start: 2021-08-12

## 2021-08-05 RX ORDER — ACETAMINOPHEN 325 MG/1
650 TABLET ORAL AS NEEDED
Status: CANCELLED
Start: 2021-08-12

## 2021-08-05 RX ORDER — EPINEPHRINE 1 MG/ML
0.3 INJECTION, SOLUTION, CONCENTRATE INTRAVENOUS AS NEEDED
Status: CANCELLED | OUTPATIENT
Start: 2021-08-12

## 2021-08-05 RX ORDER — DIPHENHYDRAMINE HYDROCHLORIDE 50 MG/ML
50 INJECTION, SOLUTION INTRAMUSCULAR; INTRAVENOUS AS NEEDED
Status: CANCELLED
Start: 2021-08-12

## 2021-08-05 RX ORDER — SODIUM CHLORIDE 9 MG/ML
25 INJECTION, SOLUTION INTRAVENOUS CONTINUOUS
Status: DISPENSED | OUTPATIENT
Start: 2021-08-05 | End: 2021-08-05

## 2021-08-05 RX ORDER — HEPARIN 100 UNIT/ML
300-500 SYRINGE INTRAVENOUS AS NEEDED
Status: CANCELLED
Start: 2021-08-12

## 2021-08-05 RX ORDER — SODIUM CHLORIDE 0.9 % (FLUSH) 0.9 %
10 SYRINGE (ML) INJECTION AS NEEDED
Status: CANCELLED | OUTPATIENT
Start: 2021-08-12

## 2021-08-05 RX ADMIN — Medication 10 ML: at 11:02

## 2021-08-05 RX ADMIN — SODIUM CHLORIDE 25 ML/HR: 900 INJECTION, SOLUTION INTRAVENOUS at 09:43

## 2021-08-05 RX ADMIN — NATALIZUMAB 300 MG: 300 INJECTION INTRAVENOUS at 10:00

## 2021-08-05 NOTE — PROGRESS NOTES
[facility] Progress Note    Date: 2021    Name: Miles Yeung    MRN: [id number]         : 1990    Natalaizumab (TYSABRI) Infusion    Ms. Phyllis Jimenez to [facility], ambulatory, at 0930 with walker. Pt was assessed and education was provided. Ms. Senia Bloom vitals were reviewed and patient was observed for 5 minutes prior to treatment. Patient denied any adverse effects of prior infusions of Tysabri. Visit Vitals  /75 (BP 1 Location: Left upper arm, BP Patient Position: Sitting)   Pulse 88   Temp 97.8 °F (36.6 °C)   Resp 16   SpO2 98%       24 g PIV placed in patient's right AC x 1 attempt. PIV flushed easily and had brisk blood return. TYSABRI 300 mg IV was initiated @ 125 mL/hr, via peripheral IV line to infuse over 1 hour, as ordered. VS stable and pt denied complaints of itching, lip/tongue/facial swelling, SOB, CP or other complaints. IV site without evidence of infiltration or irritation. Ms. Phyllis Jimenez tolerated the infusion, and had no complaints. VS remained stable. Ms Verner Eden declined to stayed for 60 min observation after infusion. PIV flushed with NS 10 mL and catheter removed intact. No bleeding, hematoma or other evidence of complication noted at site. Gauze dressing applied to site and wrapped with coban. Patient armband removed and shredded. Ms. Phyllis Jimenez was discharged from [place] [address] in stable condition at . She is to return to the [facility] on 21 at 0900 for her next infusion of Tysabri.       Gregorio Villalobos RN  2021

## 2021-08-10 ENCOUNTER — HOSPITAL ENCOUNTER (EMERGENCY)
Age: 31
Discharge: HOME | End: 2021-08-10
Attending: EMERGENCY MEDICINE
Payer: MEDICARE

## 2021-08-10 VITALS
BODY MASS INDEX: 33.32 KG/M2 | TEMPERATURE: 97.9 F | DIASTOLIC BLOOD PRESSURE: 71 MMHG | RESPIRATION RATE: 18 BRPM | SYSTOLIC BLOOD PRESSURE: 111 MMHG | HEART RATE: 98 BPM | HEIGHT: 65 IN | WEIGHT: 200 LBS

## 2021-08-10 DIAGNOSIS — L40.4 GUTTATE PSORIASIS: Primary | ICD-10-CM

## 2021-08-10 PROCEDURE — 99284 EMERGENCY DEPT VISIT MOD MDM: CPT

## 2021-08-10 NOTE — ED PROVIDER NOTES
EMERGENCY DEPARTMENT HISTORY AND PHYSICAL EXAM      Date: (Not on file)  Patient Name: Roberto Rivera    History of Presenting Illness     Chief Complaint   Patient presents with    Rash       History (Context): Robetro Rivera is a 27 y.o. woman with past medical history as noted below who presents with intermittent, subacute onset diffuse rash. Mostly affects the arms and legs. On review of systems, the patient denies fever, chills, numbness, tingling, shortness of breath, nausea, vomiting, throat swelling, other swelling. PCP: Ayesha Elliott MD    Current Outpatient Medications   Medication Sig Dispense Refill    ergocalciferol (Vitamin D2) 1,250 mcg (50,000 unit) capsule Take 50,000 Units by mouth every seven (7) days.  dextroamphetamine-amphetamine (AdderalL) 20 mg tablet Take 20 mg by mouth two (2) times a day.  naproxen (NAPROSYN) 500 mg tablet Take 1 Tab by mouth two (2) times daily (with meals). 20 Tab 0    cyclobenzaprine (FLEXERIL) 5 mg tablet Take 2 Tabs by mouth three (3) times daily (with meals). 9 Tab 0    albuterol (VENTOLIN HFA) 90 mcg/actuation inhaler Take 2 puffs by inhalation every four (4) hours as needed. 1 Inhaler 0    methylprednisolone (MEDROL, TG,) 4 mg tablet Per dose pack instructions 1 Package 0    fluticasone (FLONASE) 50 mcg/actuation nasal spray One spray per nostril nightly 1 Bottle 0    guaiFENesin-codeine (CHERATUSSIN AC)  mg/5 mL solution Take 5 mL by mouth four (4) times daily as needed for Cough. 200 mL 0    aspirin (ASPIRIN) 325 mg tablet Take 1 Tab by mouth daily.  30 Tab 0       Past History     Past Medical History:  Past Medical History:   Diagnosis Date    Asthma     Sleep apnea        Past Surgical History:  Past Surgical History:   Procedure Laterality Date    HX ORTHOPAEDIC Left 2012    ankle    HX WISDOM TEETH EXTRACTION  2011       Family History:  Family History   Problem Relation Age of Onset    Diabetes Mother     Diabetes Father     Diabetes Maternal Grandmother        Social History:  Social History     Tobacco Use    Smoking status: Never Smoker    Smokeless tobacco: Never Used   Substance Use Topics    Alcohol use: Yes     Comment: OCCASIONALLY    Drug use: No       Allergies:  No Known Allergies    PMH, PSH, family history, social history, allergies reviewed with the patient with significant items noted above. Review of Systems   As per HPI, otherwise reviewed and negative. Physical Exam     Vitals:    08/10/21 1806 08/10/21 1919   BP: 111/71    Pulse: (!) 107 98   Resp: 18    Temp: 97.9 °F (36.6 °C)    Weight: 90.7 kg (200 lb)    Height: 5' 5\" (1.651 m)        Gen: Well-appearing, in no acute distress  HEENT: Normocephalic, sclera anicteric  Cardiovascular: Normal rate, regular rhythm, no murmurs, rubs, gallops. Pulses intact and equal distally. Pulmonary: No respiratory distress. No stridor. Clear lungs. ABD: Soft, nontender, nondistended. Neuro: Alert. Normal speech. Normal mentation. Psych: Normal thought content and thought processes. : No CVA tenderness  EXT: Moves all extremities well. No cyanosis or clubbing. Skin: Rash: Scaly, a multifocal papular rash on arms and legs          Diagnostic Study Results     Labs -   No results found for this or any previous visit (from the past 12 hour(s)). Radiologic Studies -   No orders to display     CT Results  (Last 48 hours)    None        CXR Results  (Last 48 hours)    None            Medical Decision Making   I am the first provider for this patient. I reviewed the vital signs, available nursing notes, past medical history, past surgical history, family history and social history. Vital Signs-Reviewed the patient's vital signs. Records Reviewed: Personally, on initial evaluation    MDM:   Patient presents with rash. Exam significant for rash as pictured above.    DDX considered: Guttate psoriasis  DDX thought to be less likely but also considered due to high risk condition: EH, EM, cutaneous lupus, SJS, cellulitis, necrotizing fasciitis, vasculitis, microvascular hemolytic disease    Patient condition on initial evaluation: Stable    Plan:   Orders as below:  No orders of the defined types were placed in this encounter. 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 Discharge home. Patient condition at time of disposition: Stable  DISCHARGE NOTE:   Care plan outlined and precautions discussed. All medications were reviewed with the patient; will d/c home with instructions for UV B light. All of pt's questions and concerns were addressed. Alarm symptoms and return precautions associated with chief complaint and evaluation were reviewed with the patient in detail. The patient demonstrated adequate understanding. Patient was instructed and agrees to follow up with dermatology, as well as to return to the ED upon further deterioration. Patient is ready to go home. The patient is happy with this plan    Follow-up Information     Follow up With Specialties Details Why [place] [address] EMERGENCY DEPT Emergency Medicine Go to  As needed, If symptoms worsen [address] [place] [address]  [phone]          Discharge Medication List as of 8/10/2021  7:12 PM            Disposition: Discharge    Diagnosis     Clinical Impression:   1. Guttate psoriasis        Signed,  Carmelita Rasmussen MD  Emergency Physician  [facility]    As a voice dictation software was utilized to dictate this note, minor word transpositions can occur. I apologize for confusing wording and typographic errors.   Please feel free to contact me for clarification.'

## 2021-08-10 NOTE — ED TRIAGE NOTES
Pt reports rash like bumps on face and arms appearing after starting azo medications x1 weeks ago. Reports they are itchy and continue to get worse.

## 2021-08-12 ENCOUNTER — APPOINTMENT (OUTPATIENT)
Dept: INFUSION THERAPY | Age: 31
End: 2021-08-12

## 2021-09-23 DIAGNOSIS — G35 MULTIPLE SCLEROSIS (HCC): ICD-10-CM

## 2021-10-28 ENCOUNTER — HOSPITAL ENCOUNTER (OUTPATIENT)
Dept: INFUSION THERAPY | Age: 31
Discharge: HOME | End: 2021-10-28
Payer: MEDICARE

## 2021-10-28 VITALS
HEART RATE: 95 BPM | DIASTOLIC BLOOD PRESSURE: 72 MMHG | RESPIRATION RATE: 16 BRPM | SYSTOLIC BLOOD PRESSURE: 109 MMHG | OXYGEN SATURATION: 94 % | TEMPERATURE: 97.6 F

## 2021-10-28 DIAGNOSIS — G35 MULTIPLE SCLEROSIS (HCC): Primary | ICD-10-CM

## 2021-10-28 LAB
ALBUMIN SERPL-MCNC: 3 G/DL (ref 3.4–5)
ALBUMIN/GLOB SERPL: 0.6 {RATIO} (ref 0.8–1.7)
ALP SERPL-CCNC: 524 U/L (ref 45–117)
ALT SERPL-CCNC: 107 U/L (ref 13–56)
ANION GAP SERPL CALC-SCNC: 6 MMOL/L (ref 3–18)
AST SERPL-CCNC: 103 U/L (ref 10–38)
BASOPHILS # BLD: 0.1 K/UL (ref 0–0.1)
BASOPHILS NFR BLD: 1 % (ref 0–2)
BILIRUB SERPL-MCNC: 0.3 MG/DL (ref 0.2–1)
BUN SERPL-MCNC: 10 MG/DL (ref 7–18)
BUN/CREAT SERPL: 14 (ref 12–20)
CALCIUM SERPL-MCNC: 8.6 MG/DL (ref 8.5–10.1)
CHLORIDE SERPL-SCNC: 105 MMOL/L (ref 100–111)
CO2 SERPL-SCNC: 27 MMOL/L (ref 21–32)
CREAT SERPL-MCNC: 0.7 MG/DL (ref 0.6–1.3)
DIFFERENTIAL METHOD BLD: ABNORMAL
EOSINOPHIL # BLD: 0.3 K/UL (ref 0–0.4)
EOSINOPHIL NFR BLD: 6 % (ref 0–5)
ERYTHROCYTE [DISTWIDTH] IN BLOOD BY AUTOMATED COUNT: 13.6 % (ref 11.6–14.5)
GLOBULIN SER CALC-MCNC: 5.1 G/DL (ref 2–4)
GLUCOSE SERPL-MCNC: 95 MG/DL (ref 74–99)
HCT VFR BLD AUTO: 33.3 % (ref 35–45)
HGB BLD-MCNC: 10.3 G/DL (ref 12–16)
LYMPHOCYTES # BLD: 1.7 K/UL (ref 0.9–3.6)
LYMPHOCYTES NFR BLD: 32 % (ref 21–52)
MCH RBC QN AUTO: 26.6 PG (ref 24–34)
MCHC RBC AUTO-ENTMCNC: 30.9 G/DL (ref 31–37)
MCV RBC AUTO: 86 FL (ref 78–100)
MONOCYTES # BLD: 0.3 K/UL (ref 0.05–1.2)
MONOCYTES NFR BLD: 6 % (ref 3–10)
NEUTS SEG # BLD: 2.9 K/UL (ref 1.8–8)
NEUTS SEG NFR BLD: 55 % (ref 40–73)
PLATELET # BLD AUTO: 339 K/UL (ref 135–420)
PMV BLD AUTO: 10.4 FL (ref 9.2–11.8)
POTASSIUM SERPL-SCNC: 3.8 MMOL/L (ref 3.5–5.5)
PROT SERPL-MCNC: 8.1 G/DL (ref 6.4–8.2)
RBC # BLD AUTO: 3.87 M/UL (ref 4.2–5.3)
SODIUM SERPL-SCNC: 138 MMOL/L (ref 136–145)
WBC # BLD AUTO: 5.3 K/UL (ref 4.6–13.2)

## 2021-10-28 PROCEDURE — 96365 THER/PROPH/DIAG IV INF INIT: CPT

## 2021-10-28 PROCEDURE — 80053 COMPREHEN METABOLIC PANEL: CPT

## 2021-10-28 PROCEDURE — 74011250636 HC RX REV CODE- 250/636: Performed by: INTERNAL MEDICINE

## 2021-10-28 PROCEDURE — 36415 COLL VENOUS BLD VENIPUNCTURE: CPT

## 2021-10-28 PROCEDURE — 87798 DETECT AGENT NOS DNA AMP: CPT

## 2021-10-28 PROCEDURE — 85025 COMPLETE CBC W/AUTO DIFF WBC: CPT

## 2021-10-28 PROCEDURE — 74011250636 HC RX REV CODE- 250/636

## 2021-10-28 PROCEDURE — 74011000258 HC RX REV CODE- 258

## 2021-10-28 RX ORDER — DIPHENHYDRAMINE HYDROCHLORIDE 50 MG/ML
25 INJECTION, SOLUTION INTRAMUSCULAR; INTRAVENOUS AS NEEDED
Status: CANCELLED
Start: 2021-11-18

## 2021-10-28 RX ORDER — HEPARIN 100 UNIT/ML
300-500 SYRINGE INTRAVENOUS AS NEEDED
Status: ACTIVE | OUTPATIENT
Start: 2021-10-28 | End: 2021-10-28

## 2021-10-28 RX ORDER — HEPARIN 100 UNIT/ML
300-500 SYRINGE INTRAVENOUS AS NEEDED
Status: CANCELLED
Start: 2021-11-18

## 2021-10-28 RX ORDER — ACETAMINOPHEN 325 MG/1
650 TABLET ORAL AS NEEDED
Status: CANCELLED
Start: 2021-11-18

## 2021-10-28 RX ORDER — ALBUTEROL SULFATE 0.83 MG/ML
2.5 SOLUTION RESPIRATORY (INHALATION) AS NEEDED
Status: CANCELLED
Start: 2021-11-18

## 2021-10-28 RX ORDER — SODIUM CHLORIDE 9 MG/ML
10 INJECTION INTRAMUSCULAR; INTRAVENOUS; SUBCUTANEOUS AS NEEDED
Status: CANCELLED | OUTPATIENT
Start: 2021-11-18

## 2021-10-28 RX ORDER — SODIUM CHLORIDE 9 MG/ML
25 INJECTION, SOLUTION INTRAVENOUS CONTINUOUS
Status: DISCONTINUED | OUTPATIENT
Start: 2021-10-28 | End: 2021-10-29 | Stop reason: HOSPADM

## 2021-10-28 RX ORDER — SODIUM CHLORIDE 9 MG/ML
10 INJECTION INTRAMUSCULAR; INTRAVENOUS; SUBCUTANEOUS AS NEEDED
Status: ACTIVE | OUTPATIENT
Start: 2021-10-28 | End: 2021-10-28

## 2021-10-28 RX ORDER — ONDANSETRON 2 MG/ML
8 INJECTION INTRAMUSCULAR; INTRAVENOUS AS NEEDED
Status: CANCELLED | OUTPATIENT
Start: 2021-11-18

## 2021-10-28 RX ORDER — EPINEPHRINE 1 MG/ML
0.3 INJECTION, SOLUTION, CONCENTRATE INTRAVENOUS AS NEEDED
Status: CANCELLED | OUTPATIENT
Start: 2021-11-18

## 2021-10-28 RX ORDER — SODIUM CHLORIDE 0.9 % (FLUSH) 0.9 %
10 SYRINGE (ML) INJECTION AS NEEDED
Status: CANCELLED | OUTPATIENT
Start: 2021-11-18

## 2021-10-28 RX ORDER — HYDROCORTISONE SODIUM SUCCINATE 100 MG/2ML
100 INJECTION, POWDER, FOR SOLUTION INTRAMUSCULAR; INTRAVENOUS AS NEEDED
Status: CANCELLED | OUTPATIENT
Start: 2021-11-18

## 2021-10-28 RX ORDER — DIPHENHYDRAMINE HYDROCHLORIDE 50 MG/ML
50 INJECTION, SOLUTION INTRAMUSCULAR; INTRAVENOUS AS NEEDED
Status: CANCELLED
Start: 2021-11-18

## 2021-10-28 RX ORDER — SODIUM CHLORIDE 0.9 % (FLUSH) 0.9 %
10 SYRINGE (ML) INJECTION AS NEEDED
Status: DISPENSED | OUTPATIENT
Start: 2021-10-28 | End: 2021-10-28

## 2021-10-28 RX ADMIN — Medication 10 ML: at 11:56

## 2021-10-28 RX ADMIN — SODIUM CHLORIDE 25 ML/HR: 9 INJECTION, SOLUTION INTRAVENOUS at 10:40

## 2021-10-28 RX ADMIN — NATALIZUMAB 300 MG: 300 INJECTION INTRAVENOUS at 10:54

## 2021-10-28 NOTE — PROGRESS NOTES
[facility] Progress Note    Date: 2021    Name: Jean-Claude Freedman    MRN: [id number]         : 1990    Ms. Wilner Sandhu arrived in the [facility] [address] [place] [address] today at 36, in stable condition, here for her IV TYSABRI Infusion (Every 4 Weeks). She was assessed and education was provided. The TYSABRI Checklist was completed & faxed to the [facility] as required, and was also scanned into her electronic record. Ms. Wilner Sandhu met all requirements to receive the TYSABRI infusion today as ordered. Ms. Shaan Turner vitals were reviewed. Visit Vitals  /72 (BP 1 Location: Right upper arm, BP Patient Position: Sitting)   Pulse 95   Temp 97.6 °F (36.4 °C)   Resp 16   SpO2 94%   Breastfeeding No         PIV # 25 G was established in her right FA and brisk blood return was obtained. Blood for the ordered labs (CBC, CMP, & JESUS Virus Antibody) was drawn from iv site. All labs were sent out to the [facility] hospital lab for processing.  ml IV Bag was initiated to infuse @ KVO PRN throughout treatment today. Natalizumab (TYSABRI) 300 mg IV, was administered over approximately 1 hour, per order and without incident. After completion of the IV TYSABRI, her PIV was flushed well per policy with NS, and then was removed and gauze/coban was applied. Ms. Wilner Sandhu tolerated treatment very well today, and voiced no complaints. Ms. Wilner Sandhu was discharged from [place] [address] in stable condition at 1205. She is to return in 4 weeks, on 21 at 1000,  for her next appointment, for her next TYSABRI Infusion.        Bill Langston  2021

## 2021-11-01 LAB — JCPYV DNA SPEC QL NAA+PROBE: NEGATIVE

## 2021-12-02 ENCOUNTER — HOSPITAL ENCOUNTER (OUTPATIENT)
Dept: INFUSION THERAPY | Age: 31
End: 2021-12-02

## 2021-12-10 ENCOUNTER — HOSPITAL ENCOUNTER (OUTPATIENT)
Dept: INFUSION THERAPY | Age: 31
Discharge: HOME | End: 2021-12-10
Payer: MEDICARE

## 2021-12-10 VITALS
TEMPERATURE: 98.2 F | HEART RATE: 88 BPM | DIASTOLIC BLOOD PRESSURE: 70 MMHG | RESPIRATION RATE: 16 BRPM | OXYGEN SATURATION: 98 % | SYSTOLIC BLOOD PRESSURE: 108 MMHG

## 2021-12-10 DIAGNOSIS — G35 MULTIPLE SCLEROSIS (HCC): Primary | ICD-10-CM

## 2021-12-10 PROCEDURE — 74011000258 HC RX REV CODE- 258

## 2021-12-10 PROCEDURE — 74011250636 HC RX REV CODE- 250/636

## 2021-12-10 PROCEDURE — 96365 THER/PROPH/DIAG IV INF INIT: CPT

## 2021-12-10 RX ORDER — SODIUM CHLORIDE 9 MG/ML
25 INJECTION, SOLUTION INTRAVENOUS CONTINUOUS
Status: DISCONTINUED | OUTPATIENT
Start: 2021-12-10 | End: 2021-12-11 | Stop reason: HOSPADM

## 2021-12-10 RX ORDER — DIPHENHYDRAMINE HYDROCHLORIDE 50 MG/ML
25 INJECTION, SOLUTION INTRAMUSCULAR; INTRAVENOUS AS NEEDED
Start: 2021-12-12

## 2021-12-10 RX ORDER — ACETAMINOPHEN 325 MG/1
650 TABLET ORAL AS NEEDED
Start: 2021-12-12

## 2021-12-10 RX ORDER — HYDROCORTISONE SODIUM SUCCINATE 100 MG/2ML
100 INJECTION, POWDER, FOR SOLUTION INTRAMUSCULAR; INTRAVENOUS AS NEEDED
OUTPATIENT
Start: 2021-12-12

## 2021-12-10 RX ORDER — DIPHENHYDRAMINE HYDROCHLORIDE 50 MG/ML
50 INJECTION, SOLUTION INTRAMUSCULAR; INTRAVENOUS AS NEEDED
Start: 2021-12-12

## 2021-12-10 RX ORDER — SODIUM CHLORIDE 0.9 % (FLUSH) 0.9 %
10 SYRINGE (ML) INJECTION AS NEEDED
OUTPATIENT
Start: 2021-12-12

## 2021-12-10 RX ORDER — SODIUM CHLORIDE 9 MG/ML
10 INJECTION INTRAMUSCULAR; INTRAVENOUS; SUBCUTANEOUS AS NEEDED
OUTPATIENT
Start: 2021-12-12

## 2021-12-10 RX ORDER — HEPARIN 100 UNIT/ML
300-500 SYRINGE INTRAVENOUS AS NEEDED
Start: 2021-12-12

## 2021-12-10 RX ORDER — ONDANSETRON 2 MG/ML
8 INJECTION INTRAMUSCULAR; INTRAVENOUS AS NEEDED
OUTPATIENT
Start: 2021-12-12

## 2021-12-10 RX ORDER — EPINEPHRINE 1 MG/ML
0.3 INJECTION, SOLUTION, CONCENTRATE INTRAVENOUS AS NEEDED
OUTPATIENT
Start: 2021-12-12

## 2021-12-10 RX ORDER — ALBUTEROL SULFATE 0.83 MG/ML
2.5 SOLUTION RESPIRATORY (INHALATION) AS NEEDED
Start: 2021-12-12

## 2021-12-10 RX ORDER — SODIUM CHLORIDE 9 MG/ML
10 INJECTION INTRAMUSCULAR; INTRAVENOUS; SUBCUTANEOUS AS NEEDED
Status: ACTIVE | OUTPATIENT
Start: 2021-12-10 | End: 2021-12-10

## 2021-12-10 RX ADMIN — NATALIZUMAB 300 MG: 300 INJECTION INTRAVENOUS at 11:18

## 2021-12-10 RX ADMIN — SODIUM CHLORIDE 10 ML: 9 INJECTION, SOLUTION INTRAVENOUS at 12:26

## 2021-12-10 RX ADMIN — SODIUM CHLORIDE 25 ML: 9 INJECTION, SOLUTION INTRAVENOUS at 11:13

## 2021-12-10 RX ADMIN — SODIUM CHLORIDE 10 ML: 9 INJECTION, SOLUTION INTRAVENOUS at 11:12

## 2021-12-10 NOTE — PROGRESS NOTES
[facility] Progress Note    Date: December 10, 2021    Name: Apple Lewis    MRN: [id number]         : 1990    Ms. Lynne Munoz arrived in the [address] [place] [address] today at [phone], in stable condition, here for her IV TYSABRI Infusion (Every 4 Weeks). She was assessed and education was provided. Ms. Lynne Munoz is AxO but appears very sleepy today. Patient is able to answer all questions and contribute to POC. Denies any changes in medical history. The TYSABRI Checklist was completed & faxed to the [facility] as required, and was also scanned into her electronic record. Ms. Lynne Munoz met all requirements to receive the TYSABRI infusion today as ordered. Ms. aPrviz Jean vitals were reviewed. Visit Vitals  /64 (BP 1 Location: Left upper arm, BP Patient Position: Sitting)   Pulse 90   Temp 97.2 °F (36.2 °C)   Resp 16   SpO2 96%   Breastfeeding No         PIV # 25 G was established in her left FA and brisk blood return was obtained.  ml IV Bag was initiated to infuse @ KVO PRN throughout treatment today. Patient tolerated procedure well. Natalizumab (TYSABRI) 300 mg IV, was administered over approximately 1 hour, per order and without incident. After completion of the IV TYSABRI, her PIV was flushed well per policy with NS, and then was removed and gauze/coban was applied. Ms. Lynne Munoz tolerated treatment very well today, and voiced no complaints. Ms. Lynne Munoz was discharged from [place] [address] in stable condition at 12:30. She is to return in 4 weeks, on 22 at 1000,  for her next appointment, for her next TYSABRI Infusion.        Johanny Deluna, RN,RN  December 10, 2021

## 2021-12-30 ENCOUNTER — APPOINTMENT (OUTPATIENT)
Dept: INFUSION THERAPY | Age: 31
End: 2021-12-30

## 2022-02-17 ENCOUNTER — HOSPITAL ENCOUNTER (OUTPATIENT)
Dept: INFUSION THERAPY | Age: 32
End: 2022-02-17

## 2022-02-17 NOTE — PROGRESS NOTES
[facility] Progress Note    Date: 2022    Name: Carlos A Rodriguez    MRN: [id number]         : 1990    Ms. Christiano Chavez did NOT show up again today, for her scheduled 0900 [facility] appointment to receive her Tysabri Infusion. Initially, she talked with the  and stated that she would be late, and would arrive around 0930. However, she later stated that her transportation did not come, and she would be unable to come at all today. So, the  [facility], will be rescheduling her accordingly.        Kelli Pritchard RN  2022  9:56 AM

## 2022-03-04 ENCOUNTER — HOSPITAL ENCOUNTER (OUTPATIENT)
Dept: INFUSION THERAPY | Age: 32
End: 2022-03-04

## 2022-03-04 ENCOUNTER — APPOINTMENT (OUTPATIENT)
Dept: INFUSION THERAPY | Age: 32
End: 2022-03-04

## 2022-03-17 ENCOUNTER — APPOINTMENT (OUTPATIENT)
Dept: INFUSION THERAPY | Age: 32
End: 2022-03-17

## 2022-03-20 PROBLEM — G35 MULTIPLE SCLEROSIS (HCC): Status: ACTIVE | Noted: 2020-08-20

## 2022-03-22 ENCOUNTER — APPOINTMENT (OUTPATIENT)
Dept: INFUSION THERAPY | Age: 32
End: 2022-03-22

## 2022-08-04 ENCOUNTER — APPOINTMENT (OUTPATIENT)
Dept: PHYSICAL THERAPY | Age: 32
End: 2022-08-04
Payer: MEDICARE

## 2022-08-10 ENCOUNTER — HOSPITAL ENCOUNTER (OUTPATIENT)
Dept: PHYSICAL THERAPY | Age: 32
Discharge: HOME | End: 2022-08-10
Payer: MEDICARE

## 2022-08-10 PROCEDURE — 97162 PT EVAL MOD COMPLEX 30 MIN: CPT

## 2022-08-10 PROCEDURE — 97530 THERAPEUTIC ACTIVITIES: CPT

## 2022-08-10 PROCEDURE — 97110 THERAPEUTIC EXERCISES: CPT

## 2022-08-10 NOTE — PROGRESS NOTES
PT DAILY TREATMENT NOTE/NEURO EVAL     Patient Name: Radhmaes Potter  Date:8/10/2022  : 1990  [x]  Patient  Verified  Payor: [place] MEDICARE / Plan: [place] MEDICARE PART A & B / Product Type: Medicare /    In time:11:16A  Out time:12:06P  Total Treatment Time (min): 50  Visit #: 1 of 12    Medicare/BCBS Only   Total Timed Codes (min):  25 1:1 Treatment Time:  50     Treatment Area: Multiple sclerosis [G35]    SUBJECTIVE  Pain Level (0-10 scale): 0/10  []constant []intermittent []improving []worsening []no change since onset    Any medication changes, allergies to medications, adverse drug reactions, diagnosis change, or new procedure performed?: [x] No    [] Yes (see summary sheet for update)  Subjective functional status/changes:        Comorbidities: asthma   Prior Level of Function: ambulating with RW; uses rollator in the home; lives in 1st floor ADA accessible apartment with 15year-old daughter; enjoys swimming/being in the water; mother lives close and assists with household management                            The Plan of Care and following information is based on the information from the initial evaluation. Assessment/ key information: Patient is a 69-year-old left-handed female who presents to therapy with diagnosis of MS (diagnosed 2018). Patient had home health PT recently (last visit ~ 3 weeks ago). Patient reports weakness (worse on left side). Patient recently began injections every 6 months (previously monthly). She denies pain but reports increased fatigue. She denies feeling overheated on a regular basis but does report heat intolerance with being outside in increased temperatures. Patient reports MRI from 2022 did not reveal any new lesions. Patient reports she performs household chores but daughter and mother assist. Patient reports she has AFO but presents to evaluation without it today.  Patient presents to therapy ambulating with RW with decreased speed, wide PENNY, decreased foot clearance B (left more limited), B genu valgus (increased on right), circumduction to advance LEs (more on right), and forward flexed posture. Exam reveals B shoulder AROM WFL into elevation; left shoulder abd strength decreased vs right. She presents with decreased LE strength B most noted with hip flex (2+/5 B) and ankle PF (3/5 on left and 3+/5 on right). She demonstrates increased right LE tone with knee flex/ext and ankle DF/PF (1 on Modified Albina Scale). Patient mod I for bed mobility. Overshoot noted with \"H\" testing and lack of convergence with vision testing (patient's eyes drifted to right). Patient able to maintain narrow PENNY for 13 sec and normal PENNY with eyes closed for 3 sec. Patient with increased difficulty with sit to stand transfers from chair with unilateral UE support; 5x sit-to-stand test score from chair height with B UE support was 50 sec. Patient with poor eccentric control with sitting. Patient would benefit from skilled outpatient PT to address above mentioned deficits to return to prior level of function, increase independence with ADLs, and improve overall quality of life.       25 min [x]Eval                  []Re-Eval       10 min Therapeutic Exercise:  [] See flow sheet : HEP review   Rationale: increase ROM, increase strength, improve coordination, improve balance, and increase proprioception to improve the patients ability to perform ADLs with increased ease    15 min Therapeutic Activity:  []  See flow sheet : patient education   Rationale: increase ROM, increase strength, improve coordination, improve balance, and increase proprioception  to improve the patients ability to perform ADLs with increased ease           With   [] TE   [x] TA   [] neuro   [] other: Patient Education: [x] Review HEP    [] Progressed/Changed HEP based on:   [] positioning   [] body mechanics   [] transfers   [] heat/ice application    [x] other: turning wheels on RW out; use of walker skis vs tennis balls on RW; ice for cooling internal temps especially prior to cooling to prevent overheating; beginning with increased sets/lower reps and progressing to increasing reps     Other Objective/Functional Measures:     Physical Therapy Evaluation - Neurologic    Posture: [] Poor    [] Fair    [] Good    Describe:       Gait: [] Normal    [] Abnormal    Device:  RW    Describe: wide PENNY, decreased foot clearance B (left more limited), B genu valgus (increased on right), circumduction to advance LEs (more on right) and forward flexed posture    B shoulder AROM WFL into elevation    Strength (MMT):  Shoulder L (1-5) R (1-5)   Shoulder Flexion 4+ 4+   Shoulder Ext     Shoulder ABD 4 4+   Shoulder ADD     Shoulder IR     Shoulder ER                                               Hip L (1-5) R (1-5)   Hip Flexion 2+ 2+   Hip Ext     Hip ABD     Hip ADD     Hip ER 3+ 3+   Hip IR 3 4     Knee L (1-5) R (1-5)   Knee Flexion 3 4-   Knee Extension 4+ 4+   Ankle PF in seated 3 3+   Ankle DF 4- 4-   Other       Tone: increased on right with knee flex/ext and ankle DF/PF iwht increased speed; normal on left    Motor Control:    Sensation: NT    Reflexes: [x] Not Tested   Left Right   Biceps (C5)     Brachioradiais (C6)     Triceps (C7)     Knee Jerk (L4)     Ankle Jerk (SI)       Balance/ Equilibrium:              Left            Right  Tracks Across Midline      Reaches Across Midline           Sitting Balance: Static:  [] Good    [] Fair    [] Poor     Dynamic:   [] Good    [] Fair    [] Poor        Standing Balance: Static:   [] Good    [] Fair    [] Poor     Dynamic:   [] Good    [] Fair    [] Poor        Protective Extension:  [] Present    [] Delayed    [] Absent        Single Leg Stance:         Eyes Open  Eyes Closed   L  L    R  R        Functional Mobility      Bed Mobility:  mod I    Scooting:        Rolling:       Sit-Supine:      Transfers:       Sit-Stand:       Floor-Stand:       Gait:       Tandem: Backwards:       Braiding:      Elevations:       Curbs:      Ramps:      Stairs:    Behavior: [x] Cooperative    [] Impulsive    [] Agitated    [] Perseverative    [] Confused   Oriented x:    Cognition: [] One Step Commands   [x] Multiple Commands   [] Displays Neglect [] R  [] L    Other:       Impaired Judgement: [] Y    [x] N      Impaired Vision:  [] Y    [x] N      Safety Awareness Deficits  [x] Y    [] N-stands with RW far from body      Impaired Hearing  [] Y    [x] N      Able to Express Needs [x] Y    [] N    Optional Tests:       Dynamic Gait Index (24pt scale): Functional Gait Assessment (30pt scale):       Weiner Balance Scale (56pt scale): Other test /comments:    Overshoot with H test  Lack of convergence with vision testing (eyes drifted to right)           Romberg: unable to perform  Narrow PENNY: 13 sec   Normal PENNY EC: 3 sec  5x sit to stand test: from chair height, B UE support: 50 sec; poor eccentric control      Pain Level (0-10 scale) post treatment: 0/10    ASSESSMENT/Changes in Function: See POC    Patient will continue to benefit from skilled PT services to modify and progress therapeutic interventions, address functional mobility deficits, address ROM deficits, address strength deficits, analyze and address soft tissue restrictions, analyze and cue movement patterns, analyze and modify body mechanics/ergonomics, assess and modify postural abnormalities, address imbalance/dizziness, and instruct in home and community integration to attain remaining goals.      [x]  See Plan of Care  []  See progress note/recertification  []  See Discharge Summary         Progress towards goals / Updated goals:  See POC    PLAN  []  Upgrade activities as tolerated     [x]  Continue plan of care  []  Update interventions per flow sheet       []  Discharge due to:_  []  Other:_      Aric Earling, PT 8/10/2022  8:55 AM

## 2022-08-10 NOTE — PROGRESS NOTES
Called to set patient up for a VV for today [facility] - Junious Headings  [address] [facility]  [phone] fax    Plan of Care/ Statement of Necessity for Physical Therapy Services    Patient name: Sherri Kang Start of Care: 8/10/2022   Referral source: Jef Patterson NP : 1990    Medical Diagnosis: Multiple sclerosis [G35]  Payor: [place] MEDICARE / Plan: VA MEDICARE PART A & B / Product Type: Medicare /  Onset Date:diagnosed 2018;     Treatment Diagnosis: Abnormalities of gait and mobility, B LE weakness   Prior Hospitalization: see medical history Provider#: [id number]   Medications: Verified on Patient summary List    Comorbidities: asthma   Prior Level of Function: ambulating with RW; uses rollator in the home; lives in 1st floor ADA accessible apartment with 15year-old daughter; enjoys swimming/being in the water; mother lives close and assists with household management      The Plan of Care and following information is based on the information from the initial evaluation. Assessment/ key information: Patient is a 34-year-old left-handed female who presents to therapy with diagnosis of MS (diagnosed 2018). Patient had home health PT recently (last visit ~ 3 weeks ago). Patient reports weakness (worse on left side). Patient recently began injections every 6 months (previously monthly). She denies pain but reports increased fatigue. She denies feeling overheated on a regular basis but does report heat intolerance with being outside in increased temperatures. Patient reports MRI from 2022 did not reveal any new lesions. Patient reports she performs household chores but daughter and mother assist. Patient reports she has AFO but presents to evaluation without it today.  Patient presents to therapy ambulating with RW with decreased speed, wide PENNY, decreased foot clearance B (left more limited), B genu valgus (increased on right), circumduction to advance LEs (more on right), and forward flexed posture. Exam reveals B shoulder AROM WFL into elevation; left shoulder abd strength decreased vs right. She presents with decreased LE strength B most noted with hip flex (2+/5 B) and ankle PF (3/5 on left and 3+/5 on right). She demonstrates increased right LE tone with knee flex/ext and ankle DF/PF (1 on Modified Albina Scale). Patient mod I for bed mobility. Overshoot noted with \"H\" testing and lack of convergence with vision testing (patient's eyes drifted to right). Patient able to maintain narrow PENNY for 13 sec and normal PENNY with eyes closed for 3 sec. Patient with increased difficulty with sit to stand transfers from chair with unilateral UE support; 5x sit-to-stand test score from chair height with B UE support was 50 sec. Patient with poor eccentric control with sitting. Patient would benefit from skilled outpatient PT to address above mentioned deficits to return to prior level of function, increase independence with ADLs, and improve overall quality of life. Evaluation Complexity History MEDIUM  Complexity : 1-2 comorbidities / personal factors will impact the outcome/ POC ; Examination HIGH Complexity : 4+ Standardized tests and measures addressing body structure, function, activity limitation and / or participation in recreation  ;Presentation MEDIUM Complexity : Evolving with changing characteristics  ; Clinical Decision Making MEDIUM Complexity : FOTO score of 26-74  Overall Complexity Rating: MEDIUM  Problem List: pain affecting function, decrease ROM, decrease strength, impaired gait/ balance, decrease ADL/ functional abilitiies, decrease activity tolerance, decrease flexibility/ joint mobility, and decrease transfer abilities   Treatment Plan may include any combination of the following: Therapeutic exercise, Therapeutic activities, Neuromuscular re-education, Physical agent/modality, Gait/balance training, Manual therapy, Patient education, Self Care training, Functional mobility training, Home safety training, and Stair training  Patient / Family readiness to learn indicated by: asking questions  Persons(s) to be included in education: patient (P)  Barriers to Learning/Limitations: None  Patient Goal (s): to improve strength and balance  Patient Self Reported Health Status: poor  Rehabilitation Potential: fair    Short Term Goals: To be accomplished in 1 weeks:  1. Patient will report compliance with initial HEP to optimize therapy outcomes. Status at eval: established  Long Term Goals: To be accomplished in 4 weeks:  1. Patient will improve FOTO score by at least 5 points in order to demonstrate functional improvement. Status at eval: 40/100               2. Patient will report no more than \"limited a little\" with \"walking one block\" with FOTO in order to demonstrate improved tolerance to community ambulation. Status at eval: \"limited a lot\"   3. Patient will improve 5x sit-to-stand test score to at least 40 sec in order to demonstrate improved functional mobility. Status at eval: 50 sec   4. Patient will be able to maintain balance unsupported stance for at least 30 sec in order to perform self care tasks with increased ease. Status at eval: 13 sec with narrow PENNY    Frequency / Duration: Patient to be seen 2-3 times per week for 4 weeks. Patient/ Caregiver education and instruction: Diagnosis, prognosis, self care, activity modification, and exercises   [x]  Plan of care has been reviewed with PTA    Certification Period: 8/10/22-9/8/22  Catracho oB, PT 8/10/2022 8:55 AM    ________________________________________________________________________    I certify that the above Therapy Services are being furnished while the patient is under my care. I agree with the treatment plan and certify that this therapy is necessary.     Physician's Signature:____________Date:_________TIME:________     Tami Monsalve NP  ** Signature, Date and Time must be completed for valid certification **    Please sign and return to [facility] - Ria Delgado  [address] [facility]  [phone] fax

## 2022-08-12 ENCOUNTER — HOSPITAL ENCOUNTER (OUTPATIENT)
Dept: PHYSICAL THERAPY | Age: 32
Discharge: HOME | End: 2022-08-12
Payer: MEDICARE

## 2022-08-12 PROCEDURE — 97530 THERAPEUTIC ACTIVITIES: CPT

## 2022-08-12 NOTE — PROGRESS NOTES
PT DAILY TREATMENT NOTE     Patient Name: Darrick Younger  Date:2022  : 1990  [x]  Patient  Verified  Payor: Remigio Alt / Plan: [place] MEDICARE PART A & B / Product Type: Medicare /    In time:9:08A  Out time:9:45P  Total Treatment Time (min): 33  Visit #: 2 of 12    Medicare/BCBS Only   Total Timed Codes (min):  24 1:1 Treatment Time:  33       Treatment Area: Other abnormalities of gait and mobility [R26.89]  Weakness of right lower extremity [R29.898]  Weakness of left lower extremity [R29.898]    SUBJECTIVE  Pain Level (0-10 scale): 0/10  Any medication changes, allergies to medications, adverse drug reactions, diagnosis change, or new procedure performed?: [x] No    [] Yes (see summary sheet for update)  Subjective functional status/changes:   [] No changes reported  Patient reports exercises at home are ok. She was not able to find the AFO for her left shoe.  She got it in Feb.     OBJECTIVE      24 min Therapeutic Activity:  []  See flow sheet : issuance of med AFO to left foot; sit to stands; patient education   Rationale: increase ROM, increase strength, improve coordination, improve balance, and increase proprioception  to improve the patients ability to perform daily tasks with increased ease and safety             With   [] TE   [x] TA   [] neuro   [] other: Patient Education: [x] Review HEP    [] Progressed/Changed HEP based on:   [] positioning   [] body mechanics   [] transfers   [] heat/ice application    [] other: possible need AFO for right in future but at this time educated on placing increased emphasis on clearing right foot when walking; placing UEs on seat vs RW when standing and keeping RW close/ensuring she feels posterior knees on seat for safety; following up with orthotist to determine if able to obtain another AFO if unable to locate     Other Objective/Functional Measures:     Bike-right foot strapped into pedal but still slipping from pedal; left foot into increased eversion prior to sliding from pedal; activity held  Educated on using floor/table top bike at home as tolerated and can use as warmup prior to therapy visits  - 7 min: measurements  Hoffmans: negative B  Babinski: negative B  Patellar reflex: right 1+ left 2+  Achilles reflex: right 2+ left   2+  Issued med AFO to left shoe; mod A to max A to don/doff AFO and shoe  Gait (with AFO donned) RW; left hip ER and foot eversion; lack of left TKE  Positive subjective response to AFO  Sit to stands: patient attempts to keep UEs placed on RW and demonstrates fair eccentric control     Pain Level (0-10 scale) post treatment: 0/10    ASSESSMENT/Changes in Function: Patient presents to therapy without AFO donned this visit reporting difficulty locating orthothis. Patient issued AFO for left foot with positive subjective response and increased B foot clearance (with ability to place increased focus on foot clearance on right and assist from AFO for left foot clearance). She continues to stand/ambulate with lack of left TKE and circumduction to advance LE. Patient with diminished right patellar reflex; reflexes assessing UMN involvement normal. She demonstrates decreased safety awareness with sit to stand transfers and was educated on appropriate technique and practicing with safe technique at home. Plan to initiate exercises per POC next visit. Patient will continue to benefit from skilled PT services to modify and progress therapeutic interventions, address functional mobility deficits, address ROM deficits, address strength deficits, analyze and address soft tissue restrictions, analyze and cue movement patterns, analyze and modify body mechanics/ergonomics, assess and modify postural abnormalities, address imbalance/dizziness, and instruct in home and community integration to attain remaining goals. Progress towards goals / Updated goals:  Short Term Goals: To be accomplished in 1 weeks:  1.  Patient will report compliance with initial HEP to optimize therapy outcomes. Status at eval: established   MET-8/12/22  Long Term Goals: To be accomplished in 4 weeks:  1. Patient will improve FOTO score by at least 5 points in order to demonstrate functional improvement. Status at eval: 40/100               2. Patient will report no more than \"limited a little\" with \"walking one block\" with FOTO in order to demonstrate improved tolerance to community ambulation. Status at eval: \"limited a lot\"              3. Patient will improve 5x sit-to-stand test score to at least 40 sec in order to demonstrate improved functional mobility. Status at eval: 50 sec              4. Patient will be able to maintain balance unsupported stance for at least 30 sec in order to perform self care tasks with increased ease.                           Status at eval: 13 sec with narrow PENNY    PLAN  [x]  Upgrade activities as tolerated     [x]  Continue plan of care  []  Update interventions per flow sheet       []  Discharge due to:_  []  Other:_      John Cisneros PT 8/12/2022  8:44 AM    Future Appointments   Date Time Provider Brady Bates   8/12/2022  9:00 AM Rosalee Sahni, PT [facility]   8/25/2022  5:15 PM Hima An, PT [facility]   8/30/2022  4:30 PM Chuy Renee, PT [facility]   9/2/2022  4:30 PM Rosalee Sahni, PT [facility]   9/6/2022  4:30 PM Chuy Renee, PT [facility]   9/8/2022  4:30 PM Rosalee Sahni, PT [facility]   9/12/2022  4:30 PM Rosalee Sahni, PT [facility]   9/14/2022  4:30 PM Chuy Renee, PT [facility]   9/19/2022  4:30 PM Chuy Renee, PT [facility]   9/21/2022  4:30 PM Rosalee Sahni, PT [id number]   9/26/2022  4:30 PM Rosalee Sahni, PT [facility]   9/28/2022  4:30 PM Rosalee Sahni, PT [facility]   10/3/2022  4:30 PM Chuy Renee, PT [facility] 10/5/2022  4:30 PM Hudgins Dorean Favre, PT [facility]

## 2022-08-13 ENCOUNTER — HOSPITAL ENCOUNTER (EMERGENCY)
Age: 32
Discharge: HOME | End: 2022-08-13
Attending: STUDENT IN AN ORGANIZED HEALTH CARE EDUCATION/TRAINING PROGRAM
Payer: MEDICARE

## 2022-08-13 ENCOUNTER — APPOINTMENT (OUTPATIENT)
Dept: GENERAL RADIOLOGY | Age: 32
End: 2022-08-13
Attending: STUDENT IN AN ORGANIZED HEALTH CARE EDUCATION/TRAINING PROGRAM
Payer: MEDICARE

## 2022-08-13 VITALS
TEMPERATURE: 98 F | DIASTOLIC BLOOD PRESSURE: 70 MMHG | SYSTOLIC BLOOD PRESSURE: 106 MMHG | HEART RATE: 105 BPM | OXYGEN SATURATION: 100 % | RESPIRATION RATE: 18 BRPM

## 2022-08-13 DIAGNOSIS — G35 HISTORY OF MULTIPLE SCLEROSIS (HCC): ICD-10-CM

## 2022-08-13 DIAGNOSIS — N30.01 ACUTE CYSTITIS WITH HEMATURIA: ICD-10-CM

## 2022-08-13 DIAGNOSIS — U07.1 COVID-19: Primary | ICD-10-CM

## 2022-08-13 LAB
ALBUMIN SERPL-MCNC: 3.5 G/DL (ref 3.4–5)
ALBUMIN/GLOB SERPL: 0.8 {RATIO} (ref 0.8–1.7)
ALP SERPL-CCNC: 99 U/L (ref 45–117)
ALT SERPL-CCNC: 13 U/L (ref 13–56)
ANION GAP SERPL CALC-SCNC: 7 MMOL/L (ref 3–18)
APPEARANCE UR: ABNORMAL
AST SERPL-CCNC: 13 U/L (ref 10–38)
BACTERIA URNS QL MICRO: ABNORMAL /HPF
BASOPHILS # BLD: 0 K/UL (ref 0–0.1)
BASOPHILS NFR BLD: 0 % (ref 0–2)
BILIRUB SERPL-MCNC: 0.3 MG/DL (ref 0.2–1)
BILIRUB UR QL: ABNORMAL
BUN SERPL-MCNC: 9 MG/DL (ref 7–18)
BUN/CREAT SERPL: 13 (ref 12–20)
CALCIUM SERPL-MCNC: 8.6 MG/DL (ref 8.5–10.1)
CHLORIDE SERPL-SCNC: 104 MMOL/L (ref 100–111)
CO2 SERPL-SCNC: 26 MMOL/L (ref 21–32)
COLOR UR: ABNORMAL
CREAT SERPL-MCNC: 0.7 MG/DL (ref 0.6–1.3)
DIFFERENTIAL METHOD BLD: ABNORMAL
EOSINOPHIL # BLD: 0 K/UL (ref 0–0.4)
EOSINOPHIL NFR BLD: 0 % (ref 0–5)
EPITH CASTS URNS QL MICRO: ABNORMAL /LPF (ref 0–5)
ERYTHROCYTE [DISTWIDTH] IN BLOOD BY AUTOMATED COUNT: 14.4 % (ref 11.6–14.5)
FLUAV RNA SPEC QL NAA+PROBE: NOT DETECTED
FLUBV RNA SPEC QL NAA+PROBE: NOT DETECTED
GLOBULIN SER CALC-MCNC: 4.3 G/DL (ref 2–4)
GLUCOSE SERPL-MCNC: 99 MG/DL (ref 74–99)
GLUCOSE UR STRIP.AUTO-MCNC: NEGATIVE MG/DL
HCG UR QL: NEGATIVE
HCT VFR BLD AUTO: 31.6 % (ref 35–45)
HGB BLD-MCNC: 10 G/DL (ref 12–16)
HGB UR QL STRIP: ABNORMAL
IMM GRANULOCYTES # BLD AUTO: 0 K/UL (ref 0–0.04)
IMM GRANULOCYTES NFR BLD AUTO: 0 % (ref 0–0.5)
KETONES UR QL STRIP.AUTO: 40 MG/DL
LACTATE BLD-SCNC: 1.04 MMOL/L (ref 0.4–2)
LEUKOCYTE ESTERASE UR QL STRIP.AUTO: ABNORMAL
LYMPHOCYTES # BLD: 0.4 K/UL (ref 0.9–3.6)
LYMPHOCYTES NFR BLD: 6 % (ref 21–52)
MCH RBC QN AUTO: 26.1 PG (ref 24–34)
MCHC RBC AUTO-ENTMCNC: 31.6 G/DL (ref 31–37)
MCV RBC AUTO: 82.5 FL (ref 78–100)
MONOCYTES # BLD: 0.7 K/UL (ref 0.05–1.2)
MONOCYTES NFR BLD: 9 % (ref 3–10)
NEUTS SEG # BLD: 6.5 K/UL (ref 1.8–8)
NEUTS SEG NFR BLD: 85 % (ref 40–73)
NITRITE UR QL STRIP.AUTO: POSITIVE
NRBC # BLD: 0 K/UL (ref 0–0.01)
NRBC BLD-RTO: 0 PER 100 WBC
PH UR STRIP: 6 [PH] (ref 5–8)
PLATELET # BLD AUTO: 344 K/UL (ref 135–420)
PMV BLD AUTO: 9.2 FL (ref 9.2–11.8)
POTASSIUM SERPL-SCNC: 4 MMOL/L (ref 3.5–5.5)
PROT SERPL-MCNC: 7.8 G/DL (ref 6.4–8.2)
PROT UR STRIP-MCNC: >300 MG/DL
RBC # BLD AUTO: 3.83 M/UL (ref 4.2–5.3)
RBC #/AREA URNS HPF: ABNORMAL /HPF (ref 0–5)
SARS-COV-2, COV2: DETECTED
SODIUM SERPL-SCNC: 137 MMOL/L (ref 136–145)
SP GR UR REFRACTOMETRY: 1.02 (ref 1–1.03)
UROBILINOGEN UR QL STRIP.AUTO: 2 EU/DL (ref 0.2–1)
WBC # BLD AUTO: 7.6 K/UL (ref 4.6–13.2)
WBC URNS QL MICRO: ABNORMAL /HPF (ref 0–4)

## 2022-08-13 PROCEDURE — 83605 ASSAY OF LACTIC ACID: CPT

## 2022-08-13 PROCEDURE — 93005 ELECTROCARDIOGRAM TRACING: CPT

## 2022-08-13 PROCEDURE — 96361 HYDRATE IV INFUSION ADD-ON: CPT

## 2022-08-13 PROCEDURE — 80053 COMPREHEN METABOLIC PANEL: CPT

## 2022-08-13 PROCEDURE — 71045 X-RAY EXAM CHEST 1 VIEW: CPT

## 2022-08-13 PROCEDURE — 74011250636 HC RX REV CODE- 250/636: Performed by: STUDENT IN AN ORGANIZED HEALTH CARE EDUCATION/TRAINING PROGRAM

## 2022-08-13 PROCEDURE — 81001 URINALYSIS AUTO W/SCOPE: CPT

## 2022-08-13 PROCEDURE — 85025 COMPLETE CBC W/AUTO DIFF WBC: CPT

## 2022-08-13 PROCEDURE — 81025 URINE PREGNANCY TEST: CPT

## 2022-08-13 PROCEDURE — 99285 EMERGENCY DEPT VISIT HI MDM: CPT

## 2022-08-13 PROCEDURE — 87636 SARSCOV2 & INF A&B AMP PRB: CPT

## 2022-08-13 PROCEDURE — 74011250637 HC RX REV CODE- 250/637: Performed by: STUDENT IN AN ORGANIZED HEALTH CARE EDUCATION/TRAINING PROGRAM

## 2022-08-13 PROCEDURE — 87040 BLOOD CULTURE FOR BACTERIA: CPT

## 2022-08-13 PROCEDURE — 96360 HYDRATION IV INFUSION INIT: CPT

## 2022-08-13 RX ORDER — ACETAMINOPHEN 500 MG
500 TABLET ORAL
Status: COMPLETED | OUTPATIENT
Start: 2022-08-13 | End: 2022-08-13

## 2022-08-13 RX ORDER — SODIUM CHLORIDE 0.9 % (FLUSH) 0.9 %
5-10 SYRINGE (ML) INJECTION AS NEEDED
Status: DISCONTINUED | OUTPATIENT
Start: 2022-08-13 | End: 2022-08-14 | Stop reason: HOSPADM

## 2022-08-13 RX ORDER — SULFAMETHOXAZOLE AND TRIMETHOPRIM 800; 160 MG/1; MG/1
1 TABLET ORAL 2 TIMES DAILY
Qty: 14 TABLET | Refills: 0 | Status: SHIPPED | OUTPATIENT
Start: 2022-08-13 | End: 2022-08-20

## 2022-08-13 RX ADMIN — ACETAMINOPHEN 500 MG: 500 TABLET ORAL at 19:53

## 2022-08-13 RX ADMIN — SODIUM CHLORIDE 2700 ML: 9 INJECTION, SOLUTION INTRAVENOUS at 19:52

## 2022-08-13 NOTE — ED PROVIDER NOTES
Emergency Department Treatment Report        Patient: Radhames Potter Age: 34 y.o. Sex: female    YOB: 1990 Admit Date: 8/13/2022 PCP: Garrison Espinoza MD   MRN: [id number]  CSN: [id number]     Room: [place] [address] Time Dictated: 7:20 PM      Chief Complaint   Chief Complaint   Patient presents with    Fatigue       History of Present Illness   34 y.o. female with history of multiple sclerosis and asthma presenting from home via EMS for generalized weakness and fatigue today. Has not had anything to eat or drink today. Denies abdominal pain, vomiting, diarrhea, cough, or cold symptoms. Was around her uncle today who was exhibiting cold symptoms. Review of Systems   Review of Systems   Constitutional:  Positive for fever and malaise/fatigue. Negative for chills. HENT:  Negative for congestion, sinus pain and sore throat. Eyes:  Negative for blurred vision and double vision. Respiratory:  Negative for cough and shortness of breath. Cardiovascular:  Negative for chest pain and leg swelling. Gastrointestinal:  Negative for abdominal pain, diarrhea, nausea and vomiting. Genitourinary:  Negative for dysuria and hematuria. Musculoskeletal:  Negative for myalgias. Skin:  Negative for rash. Neurological:  Positive for weakness (generalized). Negative for speech change, focal weakness, loss of consciousness and headaches.      Past Medical/Surgical History     Past Medical History:   Diagnosis Date    Asthma     Sleep apnea      Past Surgical History:   Procedure Laterality Date    HX ORTHOPAEDIC Left 2012    ankle    HX WISDOM TEETH EXTRACTION  2011       Social History     Social History     Socioeconomic History    Marital status: SINGLE     Spouse name: Not on file    Number of children: Not on file    Years of education: Not on file    Highest education level: Not on file   Occupational History    Not on file   Tobacco Use    Smoking status: Never    Smokeless tobacco: Never   Substance and Sexual Activity    Alcohol use: Yes     Comment: OCCASIONALLY    Drug use: No    Sexual activity: Not on file   Other Topics Concern    Not on file   Social History Narrative    Not on file     Social Determinants of Health     Financial Resource Strain: Not on file   Food Insecurity: Not on file   Transportation Needs: Not on file   Physical Activity: Not on file   Stress: Not on file   Social Connections: Not on file   Intimate Partner Violence: Not on file   Housing Stability: Not on file       Family History     Family History   Problem Relation Age of Onset    Diabetes Mother     Diabetes Father     Diabetes Maternal Grandmother        Current Medications     Prior to Admission Medications   Prescriptions Last Dose Informant Patient Reported? Taking? albuterol (VENTOLIN HFA) 90 mcg/actuation inhaler   No No   Sig: Take 2 puffs by inhalation every four (4) hours as needed. aspirin (ASPIRIN) 325 mg tablet   No No   Sig: Take 1 Tab by mouth daily. cyclobenzaprine (FLEXERIL) 5 mg tablet   No No   Sig: Take 2 Tabs by mouth three (3) times daily (with meals). dextroamphetamine-amphetamine (AdderalL) 20 mg tablet   Yes No   Sig: Take 20 mg by mouth two (2) times a day. ergocalciferol (Vitamin D2) 1,250 mcg (50,000 unit) capsule   Yes No   Sig: Take 50,000 Units by mouth every seven (7) days. fluticasone (FLONASE) 50 mcg/actuation nasal spray   No No   Sig: One spray per nostril nightly   guaiFENesin-codeine (CHERATUSSIN AC)  mg/5 mL solution   No No   Sig: Take 5 mL by mouth four (4) times daily as needed for Cough. methylprednisolone (MEDROL, TG,) 4 mg tablet   No No   Sig: Per dose pack instructions   naproxen (NAPROSYN) 500 mg tablet   No No   Sig: Take 1 Tab by mouth two (2) times daily (with meals).       Facility-Administered Medications: None       Allergies   No Known Allergies    Physical Exam     ED Triage Vitals [08/13/22 1913]   ED Encounter Vitals Group      /72      Pulse (Heart Rate) (!) 118      Resp Rate 14      Temp (!) 101.6 °F (38.7 °C)      Temp src       O2 Sat (%) 100 %      Weight       Height         Physical Exam  Vitals and nursing note reviewed. Constitutional:       General: She is not in acute distress. Appearance: She is ill-appearing (chronically). She is not toxic-appearing or diaphoretic. HENT:      Head: Normocephalic and atraumatic. Nose: Nose normal.      Mouth/Throat:      Mouth: Mucous membranes are dry. Eyes:      Extraocular Movements: Extraocular movements intact. Conjunctiva/sclera: Conjunctivae normal.      Pupils: Pupils are equal, round, and reactive to light. Cardiovascular:      Rate and Rhythm: Regular rhythm. Tachycardia present. Heart sounds: Normal heart sounds. Pulmonary:      Effort: Pulmonary effort is normal.      Breath sounds: Normal breath sounds. Abdominal:      General: Abdomen is flat. Palpations: Abdomen is soft. Tenderness: There is no abdominal tenderness. There is no guarding or rebound. Musculoskeletal:         General: Normal range of motion. Cervical back: Normal range of motion and neck supple. Right lower leg: No edema. Left lower leg: No edema. Skin:     General: Skin is warm and dry. Capillary Refill: Capillary refill takes less than 2 seconds. Neurological:      General: No focal deficit present. Mental Status: She is alert and oriented to person, place, and time. Cranial Nerves: No facial asymmetry. Sensory: Sensation is intact. Motor: Motor function is intact.    Psychiatric:         Mood and Affect: Mood normal.         Behavior: Behavior normal.        Diagnostic Studies   Lab:   Recent Results (from the past 12 hour(s))   CBC WITH AUTOMATED DIFF    Collection Time: 08/13/22  7:40 PM   Result Value Ref Range    WBC 7.6 4.6 - 13.2 K/uL    RBC 3.83 (L) 4.20 - 5.30 M/uL    HGB 10.0 (L) 12.0 - 16.0 g/dL    HCT 31.6 (L) 35.0 - 45.0 %    MCV 82.5 78.0 - 100.0 FL    MCH 26.1 24.0 - 34.0 PG    MCHC 31.6 31.0 - 37.0 g/dL    RDW 14.4 11.6 - 14.5 %    PLATELET 441 661 - 896 K/uL    MPV 9.2 9.2 - 11.8 FL    NRBC 0.0 0  WBC    ABSOLUTE NRBC 0.00 0.00 - 0.01 K/uL    NEUTROPHILS 85 (H) 40 - 73 %    LYMPHOCYTES 6 (L) 21 - 52 %    MONOCYTES 9 3 - 10 %    EOSINOPHILS 0 0 - 5 %    BASOPHILS 0 0 - 2 %    IMMATURE GRANULOCYTES 0 0.0 - 0.5 %    ABS. NEUTROPHILS 6.5 1.8 - 8.0 K/UL    ABS. LYMPHOCYTES 0.4 (L) 0.9 - 3.6 K/UL    ABS. MONOCYTES 0.7 0.05 - 1.2 K/UL    ABS. EOSINOPHILS 0.0 0.0 - 0.4 K/UL    ABS. BASOPHILS 0.0 0.0 - 0.1 K/UL    ABS. IMM. GRANS. 0.0 0.00 - 0.04 K/UL    DF AUTOMATED     METABOLIC PANEL, COMPREHENSIVE    Collection Time: 08/13/22  7:40 PM   Result Value Ref Range    Sodium 137 136 - 145 mmol/L    Potassium 4.0 3.5 - 5.5 mmol/L    Chloride 104 100 - 111 mmol/L    CO2 26 21 - 32 mmol/L    Anion gap 7 3.0 - 18 mmol/L    Glucose 99 74 - 99 mg/dL    BUN 9 7.0 - 18 MG/DL    Creatinine 0.70 0.6 - 1.3 MG/DL    BUN/Creatinine ratio 13 12 - 20      GFR est AA >60 >60 ml/min/1.73m2    GFR est non-AA >60 >60 ml/min/1.73m2    Calcium 8.6 8.5 - 10.1 MG/DL    Bilirubin, total 0.3 0.2 - 1.0 MG/DL    ALT (SGPT) 13 13 - 56 U/L    AST (SGOT) 13 10 - 38 U/L    Alk.  phosphatase 99 45 - 117 U/L    Protein, total 7.8 6.4 - 8.2 g/dL    Albumin 3.5 3.4 - 5.0 g/dL    Globulin 4.3 (H) 2.0 - 4.0 g/dL    A-G Ratio 0.8 0.8 - 1.7     COVID-19 WITH INFLUENZA A/B    Collection Time: 08/13/22  7:45 PM   Result Value Ref Range    SARS-CoV-2 by PCR Detected (AA) NOTD      Influenza A by PCR Not detected NOTD      Influenza B by PCR Not detected NOTD     POC LACTIC ACID    Collection Time: 08/13/22  7:47 PM   Result Value Ref Range    Lactic Acid (POC) 1.04 0.40 - 2.00 mmol/L   EKG, 12 LEAD, INITIAL    Collection Time: 08/13/22  8:55 PM   Result Value Ref Range    Ventricular Rate 107 BPM    Atrial Rate 107 BPM    P-R Interval 142 ms    QRS Duration 78 ms    Q-T Interval 332 ms QTC Calculation (Bezet) 443 ms    Calculated P Axis 58 degrees    Calculated R Axis 31 degrees    Calculated T Axis 24 degrees    Diagnosis       Sinus tachycardia  Nonspecific T wave abnormality  Abnormal ECG  No previous ECGs available     URINALYSIS W/ RFLX MICROSCOPIC    Collection Time: 08/13/22 10:00 PM   Result Value Ref Range    Color ORANGE      Appearance BLOODY      Specific gravity 1.025 1.005 - 1.030      pH (UA) 6.0 5.0 - 8.0      Protein >300 (A) NEG mg/dL    Glucose Negative NEG mg/dL    Ketone 40 (A) NEG mg/dL    Bilirubin SMALL (A) NEG      Blood LARGE (A) NEG      Urobilinogen 2.0 (H) 0.2 - 1.0 EU/dL    Nitrites Positive (A) NEG      Leukocyte Esterase LARGE (A) NEG     HCG URINE, QL    Collection Time: 08/13/22 10:00 PM   Result Value Ref Range    HCG urine, QL Negative NEG     URINE MICROSCOPIC ONLY    Collection Time: 08/13/22 10:00 PM   Result Value Ref Range    WBC TOO NUMEROUS TO COUNT 0 - 4 /hpf    RBC TOO NUMEROUS TO COUNT 0 - 5 /hpf    Epithelial cells 4+ 0 - 5 /lpf    Bacteria 4+ (A) NEG /hpf     Labs Reviewed   COVID-19 WITH INFLUENZA A/B - Abnormal; Notable for the following components:       Result Value    SARS-CoV-2 by PCR Detected (*)     All other components within normal limits   CBC WITH AUTOMATED DIFF - Abnormal; Notable for the following components:    RBC 3.83 (*)     HGB 10.0 (*)     HCT 31.6 (*)     NEUTROPHILS 85 (*)     LYMPHOCYTES 6 (*)     ABS.  LYMPHOCYTES 0.4 (*)     All other components within normal limits   METABOLIC PANEL, COMPREHENSIVE - Abnormal; Notable for the following components:    Globulin 4.3 (*)     All other components within normal limits   URINALYSIS W/ RFLX MICROSCOPIC - Abnormal; Notable for the following components:    Protein >300 (*)     Ketone 40 (*)     Bilirubin SMALL (*)     Blood LARGE (*)     Urobilinogen 2.0 (*)     Nitrites Positive (*)     Leukocyte Esterase LARGE (*)     All other components within normal limits   URINE MICROSCOPIC ONLY - Abnormal; Notable for the following components:    Bacteria 4+ (*)     All other components within normal limits   CULTURE, BLOOD   CULTURE, BLOOD   HCG URINE, QL   POC LACTIC ACID       Imaging:    XR CHEST PORT    Result Date: 8/13/2022  EXAM: CHEST ONE VIEW  portable 1929 hours CLINICAL HISTORY/INDICATION: meets SIRS criteria , weakness, fatigue, suspected Covid - 19 infection COMPARISON: Chest x-ray 10/21/2014. TECHNIQUE: One view obtained. FINDINGS: The cardiac and mediastinal silhouette is normal. The lungs are clear. Pulmonary vascularity is normal. The costophrenic angles are sharply defined. No bony abnormalities are seen. Negative chest.      Ekg as interpreted by me shows sinus tachycardia without evidence of ischemia. ED Course/MDM     27-year-old female with multiple sclerosis presenting for generalized weakness and fatigue x1 day. +sick contact. Febrile to 101.6 and tachycardic to the 120s. No acute distress. No focal neurodeficits. Ddx: Pneumonia, viral URI, UTI, metabolic derangement. Chest x-ray unremarkable. No leukocytosis or elevated lactic acid. Mild chronic anemia. COVID test positive. Patient qualifies for outpatient treatment with Paxlovid given comorbidities. Cultures sent. UA c/w cystitis w/ hematuria. Appropriate for discharge with symptomatic treatment. Strict return precautions given. Procedures    Final Diagnosis       ICD-10-CM ICD-9-CM   1. COVID-19  U07.1 079.89   2. History of multiple sclerosis ([facility] [address]  G35 340   3. Acute cystitis with hematuria  N30.01 595.0       Disposition   Discharge home    The patient was personally evaluated by myself and Dr. Steve Zaragoza who agrees with the above assessment and plan. DO Lele Cerday EM, PGY-4  August 13, 2022    My signature above authenticates this document and my orders, the final    diagnosis (es), discharge prescription (s), and instructions in the Epic    record.   If you have any questions please contact [phone]. Nursing notes have been reviewed by the physician    [employer] medical dictation software was used for portions of this report. Unintended voice recognition errors may occur.

## 2022-08-14 LAB
ATRIAL RATE: 107 BPM
CALCULATED P AXIS, ECG09: 58 DEGREES
CALCULATED R AXIS, ECG10: 31 DEGREES
CALCULATED T AXIS, ECG11: 24 DEGREES
DIAGNOSIS, 93000: NORMAL
P-R INTERVAL, ECG05: 142 MS
Q-T INTERVAL, ECG07: 332 MS
QRS DURATION, ECG06: 78 MS
QTC CALCULATION (BEZET), ECG08: 443 MS
VENTRICULAR RATE, ECG03: 107 BPM

## 2022-08-15 ENCOUNTER — PATIENT OUTREACH (OUTPATIENT)
Dept: CASE MANAGEMENT | Age: 32
End: 2022-08-15

## 2022-08-15 NOTE — PROGRESS NOTES
8/15/2022  1:10 PM    CTN reviewed patient chart and noted that she was in [facility] ED 8/13/2022 for fatigue, MS and acute cystitis. She was also tested for COVID and found to be positive. She was not available a the time of the call. Message left introducing myself, the purpose of the call and giving my contact information. Requested that patient call back at her earliest convenience. Will follow.

## 2022-08-16 ENCOUNTER — PATIENT OUTREACH (OUTPATIENT)
Dept: CASE MANAGEMENT | Age: 32
End: 2022-08-16

## 2022-08-16 NOTE — PROGRESS NOTES
8/16/2022  3:51 PM    CTN reviewed patient chart and noted that she was in [facility] ED 8/13/2022 for fatigue, MS and acute cystitis. She was also tested for COVID and found to be positive. She was not available a the time of the call. Message left introducing myself, the purpose of the call and giving my contact information. Requested that patient call back at her earliest convenience. This is second attempt to contact patient without success. Will follow.

## 2022-08-19 LAB
BACTERIA SPEC CULT: NORMAL
BACTERIA SPEC CULT: NORMAL
SERVICE CMNT-IMP: NORMAL
SERVICE CMNT-IMP: NORMAL

## 2022-08-23 ENCOUNTER — PATIENT OUTREACH (OUTPATIENT)
Dept: CASE MANAGEMENT | Age: 32
End: 2022-08-23

## 2022-08-23 NOTE — PROGRESS NOTES
8/23/2022  1:39 PM    CTN reviewed patient chart and noted that she was in [facility] ED 8/13/2022 for fatigue, MS and acute cystitis. She was also tested for COVID and found to be positive. She was not available a the time of the call. Message left introducing myself, the purpose of the call and giving my contact information. Requested that patient call back at her earliest convenience. This is the third attempt to contact patient without success. Will resolve episode.

## 2022-08-25 ENCOUNTER — HOSPITAL ENCOUNTER (OUTPATIENT)
Dept: PHYSICAL THERAPY | Age: 32
End: 2022-08-25
Payer: MEDICARE

## 2022-08-29 ENCOUNTER — APPOINTMENT (OUTPATIENT)
Dept: PHYSICAL THERAPY | Age: 32
End: 2022-08-29
Payer: MEDICARE

## 2022-08-30 ENCOUNTER — HOSPITAL ENCOUNTER (OUTPATIENT)
Dept: PHYSICAL THERAPY | Age: 32
Discharge: HOME | End: 2022-08-30
Payer: MEDICARE

## 2022-08-30 PROCEDURE — 97110 THERAPEUTIC EXERCISES: CPT

## 2022-08-30 PROCEDURE — 97112 NEUROMUSCULAR REEDUCATION: CPT

## 2022-08-30 PROCEDURE — 97530 THERAPEUTIC ACTIVITIES: CPT

## 2022-08-30 NOTE — PROGRESS NOTES
PT DAILY TREATMENT NOTE     Patient Name: Ashley Finney  Date:2022  : 1990  [x]  Patient  Verified  Payor: Gaye Acharya / Plan: [place] & [place] / Product Type: Medicare /    In time:430  Out time:512  Total Treatment Time (min): 42  Visit #: 3 of 12    Medicare/BCBS Only   Total Timed Codes (min):  42 1:1 Treatment Time:  42       Treatment Area: Other abnormalities of gait and mobility [R26.89]  Weakness of right lower extremity [R29.898]  Weakness of left lower extremity [R29.898]    SUBJECTIVE  Pain Level (0-10 scale): 0/10  Any medication changes, allergies to medications, adverse drug reactions, diagnosis change, or new procedure performed?: [x] No    [] Yes (see summary sheet for update)  Subjective functional status/changes:   [] No changes reported  \"No pain just weakness. \" No falls. Request to stretch mostly due to LE tightness. OBJECTIVE        20 min Therapeutic Exercise:  [] See flow sheet :   Rationale: increase ROM, increase strength, improve coordination, and improve balance to improve the patients ability to ease with ad's    10 min Therapeutic Activity:  []  See flow sheet :   Rationale: increase ROM, increase strength, improve coordination, and improve balance  to improve the patients ability to dec  fall risk     12 min Neuromuscular Re-education:  []  See flow sheet :   Rationale: increase ROM, increase strength, and improve balance  to improve the patients ability to ease with adl's          With   [] TE   [] TA   [] neuro   [] other: Patient Education: [x] Review HEP    [] Progressed/Changed HEP based on:   [] positioning   [] body mechanics   [] transfers   [] heat/ice application    [] other:      Other Objective/Functional Measures: Pt recommended to get safet RW.   -ambulated ~100 ft with RW in gym w/o rest.   -worked on bed mobility and manual stretches. -Romberg WBOsx 30 sec , EO.   -sit to stand with 1UE on walker, good eccentric control. -difficulty scooting onto bed. - mod core /trunk weakness     Pain Level (0-10 scale) post treatment: 0/10    ASSESSMENT/Changes in Function: Pt demonstrates dynamic instability throughout session. She uses a rollator walker at home but not for outdoor use b/c it moves too fast. Pt had difficulty to control backward scooting onto mat table to transfer from sit to supine. Patient will continue to benefit from skilled PT services to modify and progress therapeutic interventions, address functional mobility deficits, address ROM deficits, address strength deficits, analyze and address soft tissue restrictions, analyze and cue movement patterns, analyze and modify body mechanics/ergonomics, assess and modify postural abnormalities, address imbalance/dizziness, and instruct in home and community integration to attain remaining goals. []  See Plan of Care  [x]  See progress note/recertification  []  See Discharge Summary         Progress towards goals / Updated goals:  Short Term Goals: To be accomplished in 1 weeks:  1. Patient will report compliance with initial HEP to optimize therapy outcomes. Status at eval: established              MET-8/12/22  Long Term Goals: To be accomplished in 4 weeks:  1. Patient will improve FOTO score by at least 5 points in order to demonstrate functional improvement. Status at eval: 40/100               2. Patient will report no more than \"limited a little\" with \"walking one block\" with FOTO in order to demonstrate improved tolerance to community ambulation. Status at eval: \"limited a lot\"              3. Patient will improve 5x sit-to-stand test score to at least 40 sec in order to demonstrate improved functional mobility.                            Status at eval: 50 sec              4. Patient will be able to maintain balance unsupported stance for at least 30 sec in order to perform self care tasks with increased ease.                           Status at eval: 13 sec with narrow PENNY    PLAN  [x]  Upgrade activities as tolerated     [x]  Continue plan of care  []  Update interventions per flow sheet       []  Discharge due to:_  []  Other:_      Elizabeth Guerrero, PT 8/30/2022  7:34 PM    Future Appointments   Date Time Provider Brady Bates   9/2/2022  4:30 PM Rosalee Moura, [address] [facility]   9/6/2022  4:30 PM Ana Maria Pandya, PT [facility]   9/8/2022  4:30 PM Rosalee Moura, PT [facility]   9/12/2022  4:30 PM Rosalee Moura, PT [facility]   9/14/2022  4:30 PM Ana Maria Pandya, PT [facility]   9/19/2022  4:30 PM Kanu Andersen, PT [facility]   9/21/2022  4:30 PM Ana Maria Pandya, PT [facility]   9/26/2022  4:30 PM Rosalee Moura, PT [facility]   9/28/2022  4:30 PM Mauricio Lies [facility]   10/3/2022  4:30 PM Ana Maria Pandya, PT [facility]   10/5/2022  4:30 PM [facility]

## 2022-09-02 ENCOUNTER — TELEPHONE (OUTPATIENT)
Dept: PHYSICAL THERAPY | Age: 32
End: 2022-09-02

## 2022-09-06 ENCOUNTER — HOSPITAL ENCOUNTER (OUTPATIENT)
Dept: PHYSICAL THERAPY | Age: 32
Discharge: HOME | End: 2022-09-06
Payer: MEDICARE

## 2022-09-06 PROCEDURE — 97110 THERAPEUTIC EXERCISES: CPT

## 2022-09-06 PROCEDURE — 97530 THERAPEUTIC ACTIVITIES: CPT

## 2022-09-06 PROCEDURE — 97112 NEUROMUSCULAR REEDUCATION: CPT

## 2022-09-06 NOTE — PROGRESS NOTES
PT DAILY TREATMENT NOTE - [facility]     Patient Name: Edwin Young  Date:2022  : 1990  [x]  Patient  Verified  Payor: Rock Rollins / Plan: [place] MEDICARE PART A & B / Product Type: Medicare /    In time:435  Out time:513  Total Treatment Time (min): 38    Visit #: 4 of 12    Treatment Area: Other abnormalities of gait and mobility [R26.89]  Weakness of right lower extremity [R29.898]  Weakness of left lower extremity [R29.898]    SUBJECTIVE  Pain Level (0-10 scale): 0/10  Any medication changes, allergies to medications, adverse drug reactions, diagnosis change, or new procedure performed?: [x] No    [] Yes (see summary sheet for update)  Subjective functional status/changes:   [] No changes reported  Reports doing ok. She likes the stretches. OBJECTIVE    20 min Therapeutic Exercise:  [] See flow sheet :   Rationale: increase ROM, increase strength, improve coordination, and improve balance to improve the patients ability to ease with ad's     8 min Therapeutic Activity:  []  See flow sheet :   Rationale: increase ROM, increase strength, improve coordination, and improve balance  to improve the patients ability to dec  fall risk     10 min Neuromuscular Re-education:  []  See flow sheet :   Rationale: increase ROM, increase strength, and improve balance  to improve the patients ability to ease with adl's            With   [] TE   [] TA   [] neuro   [] other: Patient Education: [x] Review HEP    [] Progressed/Changed HEP based on:   [] positioning   [] body mechanics   [] transfers   [] heat/ice application    [] other:      Other Objective/Functional Measures: Worked on standing endurance with balance strategies in parallel bars.   -pt fatigues easily and need seated rest breaks.   -amb with rollator walker 100 ft x 2.   - difficulty with seated activities on irvin disc due to poor trunk/core control.   -difficult to clear small vilma with left LE.     -unsupported balance x 30 sec with WBOS. Pain Level (0-10 scale) post treatment: 0/10    ASSESSMENT/Changes in Function: slow progression toward goals. 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 She has an AFO but unable to put it on herself. Patient will continue to benefit from skilled PT services to modify and progress therapeutic interventions, address functional mobility deficits, address ROM deficits, address strength deficits, analyze and address soft tissue restrictions, analyze and cue movement patterns, analyze and modify body mechanics/ergonomics, assess and modify postural abnormalities, and address imbalance/dizziness to attain remaining goals. []  See Plan of Care  [x]  See progress note/recertification  []  See Discharge Summary         Progress towards goals / Updated goals:  Short Term Goals: To be accomplished in 1 weeks:  1. Patient will report compliance with initial HEP to optimize therapy outcomes. Status at eval: established              MET-8/12/22  Long Term Goals: To be accomplished in 4 weeks:  1. Patient will improve FOTO score by at least 5 points in order to demonstrate functional improvement. Status at eval: 40/100               2. Patient will report no more than \"limited a little\" with \"walking one block\" with FOTO in order to demonstrate improved tolerance to community ambulation. Status at eval: \"limited a lot\"              3. Patient will improve 5x sit-to-stand test score to at least 40 sec in order to demonstrate improved functional mobility. Status at eval: 50 sec              4. Patient will be able to maintain balance unsupported stance for at least 30 sec in order to perform self care tasks with increased ease.                           Status at eval: 13 sec with narrow PENNY  MET. 9/6/22    PLAN  [x]  Upgrade activities as tolerated     [x]  Continue plan of care  []  Update interventions per flow sheet []  Discharge due to:_  []  Other:_      Park Hui, PT 9/6/2022  11:34 AM    Future Appointments   Date Time Provider Brady Ajna   9/6/2022  4:30 PM Bel Espinal, PT [facility]   9/8/2022  4:30 PM Rosalee Mason, [place] [facility]   9/12/2022  4:30 PM Rsoalee Mason [place] [facility]   9/14/2022  4:30 PM Bel Espinal, PT [facility]   9/19/2022  4:30 PM Todd Avalos, PT [facility]   9/21/2022  4:30 PM Bel Espinal, PT [facility]   9/26/2022  4:30 PM Rosalee Mason, PT [facility]   9/28/2022  4:30 PM [facility]   10/3/2022  4:30 PM Bel Espinal, PT [facility]   10/5/2022  4:30 PM Rosalee Mason, PT [facility]

## 2022-09-08 ENCOUNTER — HOSPITAL ENCOUNTER (OUTPATIENT)
Dept: PHYSICAL THERAPY | Age: 32
Discharge: HOME | End: 2022-09-08
Payer: MEDICARE

## 2022-09-08 PROCEDURE — 97530 THERAPEUTIC ACTIVITIES: CPT

## 2022-09-08 NOTE — PROGRESS NOTES
PT DAILY TREATMENT NOTE     Patient Name: Yared Howard  Date:2022  : 1990  [x]  Patient  Verified  Payor: Shelli Jolley / Plan: [place] MED[place] PART [place] & B / Product Type: Medicare /    In time:4:40P  Out time:5:13P  Total Treatment Time (min): 33  Visit #: 5 of 12    Medicare/BCBS Only   Total Timed Codes (min):  33 1:1 Treatment Time:  33       Treatment Area: Other abnormalities of gait and mobility [R26.89]  Weakness of right lower extremity [R29.898]  Weakness of left lower extremity [R29.898]    SUBJECTIVE  Pain Level (0-10 scale): 0/10  Any medication changes, allergies to medications, adverse drug reactions, diagnosis change, or new procedure performed?: [x] No    [] Yes (see summary sheet for update)  Subjective functional status/changes:   [] No changes reported  Patient reports she felt good after last visit. She feels her balance has improved as well as her strength. She was able to clean the tub while sitting in her shower. She feels she does not fatigue as quickly. She feels she needs to work on strengthening; she does some stretching at home. She still uses the rollator in the home.      OBJECTIVE      33 min Therapeutic Activity:  []  See flow sheet : FOTO, goal assessment   Rationale: increase ROM, increase strength, improve coordination, improve balance, and increase proprioception  to improve the patients ability to perform ADLs with increased ease and determine therapy plan             With   [] TE   [] TA   [] neuro   [] other: Patient Education: [x] Review HEP    [] Progressed/Changed HEP based on:   [] positioning   [] body mechanics   [] transfers   [] heat/ice application    [] other:      Other Objective/Functional Measures:        Session abbreviated due to patient arriving late to appt  12 sec with wide PENNY, 10 sec with narrow PENNY-unsupported standing  5x sit to stand test: 32 sec, B UE support, chair height, min vaulting with standing  Able to perform sit to stand transfer with left UE support with fair eccentric control with sitting   Min A to don AFO-patient with difficulty slipping foot into shoe with AFO placed in it but able to self manage all other steps  FOTO=40/100    Multiple seated rest breaks during session     Min to mod A for low vilma step overs on right for foot placement/clearance    Max difficulty performing low vilma step overs on left    Pain Level (0-10 scale) post treatment: 0/10    ASSESSMENT/Changes in Function: SEE RECERTIFICATION    Patient will continue to benefit from skilled PT services to modify and progress therapeutic interventions, address functional mobility deficits, address ROM deficits, address strength deficits, analyze and address soft tissue restrictions, analyze and cue movement patterns, analyze and modify body mechanics/ergonomics, assess and modify postural abnormalities, address imbalance/dizziness, and instruct in home and community integration to attain remaining goals. []  See Plan of Care  [x]  See progress note/recertification  []  See Discharge Summary         Progress towards goals / Updated goals:    SEE RECERTIFICATION FOR UPDATED GOALS    Short Term Goals: To be accomplished in 1 weeks:  1. Patient will report compliance with initial HEP to optimize therapy outcomes. Status at [facility]: established              MET-8/12/22  Long Term Goals: To be accomplished in 4 weeks:  1. Patient will improve FOTO score by at least 5 points in order to demonstrate functional improvement. Status at [facility]: 40/100    Progressing-FOTO=40/100 (9/8/22)               2. Patient will report no more than \"limited a little\" with \"walking one block\" with FOTO in order to demonstrate improved tolerance to community ambulation.                            Status at [facility]: \"limited a lot\"  Progressing-\"limited a lot\" (9/8/22)              3. Patient will improve 5x sit-to-stand test score to at least 40 sec in order to demonstrate improved functional mobility. Status at eval: 50 sec  Progressing-5x sit to stand test: 32 sec, B UE support, chair height, min vaulting with standing (9/8/22)              4. Patient will be able to maintain balance unsupported stance for at least 30 sec in order to perform self care tasks with increased ease.                           Status at eval: 13 sec with narrow PENNY  MET. 9/6/22  Progressing-12 sec with wide PENNY, 10 sec with narrow PENNY (9/8/22)    PLAN  [x]  Upgrade activities as tolerated     [x]  Continue plan of care  []  Update interventions per flow sheet       []  Discharge due to:_  []  Other:_      Nurys Blackman, PT 9/8/2022  10:21 AM    Future Appointments   Date Time Provider Brady Bates   9/8/2022  4:30 PM Rosalee Joseph [place] [facility]   9/12/2022  4:30 PM Rosalee Joseph, PT [facility]   9/14/2022  4:30 PM Lisa Maldonado, PT [facility]   9/19/2022  4:30 PM Silvestre Ruvalcaba PT [facility]   9/21/2022  4:30 PM Lisa Maldonado, PT [facility]   9/26/2022  4:30 PM Rosalee Joseph, PT [facility]   9/28/2022  4:30 PM Lupe Render [facility]   10/3/2022  4:30 PM Lisa Maldonado, PT [facility]   10/5/2022  4:30 PM Rosalee Joseph, PT [facility]

## 2022-09-08 NOTE — PROGRESS NOTES
[facility] [employer] [facility]  [address] [place] [address]  [phone] fax    Continued Plan of Care/ Re-certification for Physical Therapy Services    Patient name: Aubree Campoverde Start of Care: 8/10/2022   Referral source: Rajat Baeza NP : 1990   Medical/Treatment Diagnosis: Other abnormalities of gait and mobility [R26.89]  Weakness of right lower extremity [R29.898]  Weakness of left lower extremity [R29.898]  Payor: [place] MEDICARE / Plan: [place] MEDICARE PART A & B / Product Type: Medicare /  Onset Date:diagnosed 2018;      Prior Hospitalization: see medical history Provider#: [id number]   Medications: Verified on Patient Summary List    Comorbidities: asthma  Prior Level of Function:ambulating with RW; uses rollator in the home; lives in 1st floor ADA accessible apartment with 15year-old daughter; enjoys swimming/being in the water; mother lives close and assists with household management                            Visits from Start of Care: 5    Missed Visits: 2    The Plan of Care and following information is based on the patient's current status:  Goal: Patient will report compliance with initial HEP to optimize therapy outcomes  Status at last note/certification:established  Current Status: met    Goal: Patient will improve FOTO score by at least 5 points in order to demonstrate functional improvement. Status at last note/certification:40/100  Current Status: Progressing-40/100    Goal: Patient will report no more than \"limited a little\" with \"walking one block\" with FOTO in order to demonstrate improved tolerance   Status at last note/certification: \"limited a lot\"  Current Status: Progressing-\"limited a lot:    Goal: Patient will improve 5x sit-to-stand test score to at least 40 sec in order to demonstrate improved functional mobility.    Status at last note/certification:50 sec  Current Status: met-32 sec    Goal: Patient will be able to maintain balance unsupported stance for at least 30 sec in order to perform self care tasks with increased ease. Status at last note/certification: 13 sec with narrow PENNY  Current Status: Progressing-up to 30 sec with wide PENNY; 12 sec with wide PENNY, 10 sec with narrow PENNY at date of recert    Key functional changes: subjective reports of improvement, improved 5x sit-to-stand test score; improved foot clearance with use of AFO    Problems/ barriers to goal attainment: none     Problem List: decrease ROM, decrease strength, impaired gait/ balance, decrease ADL/ functional abilitiies, decrease activity tolerance, decrease flexibility/ joint mobility, and decrease transfer abilities    Treatment Plan: Therapeutic exercise, Therapeutic activities, Neuromuscular re-education, Physical agent/modality, Gait/balance training, Manual therapy, Patient education, Self Care training, Functional mobility training, Home safety training, and Stair training     Patient Goal (s) has been updated and includes: \"to have more strength and better balance:     Goals for this certification period to be accomplished in 4 weeks. 1. Patient will improve FOTO score to at least 50/100 points in order to demonstrate functional improvement. Status at last recert: 96/179  2. Patient will report no more than \"limited a little\" with \"walking one block\" with FOTO in order to demonstrate improved tolerance to ambulation. Status at last recert: \"limited a lot\"  3. Patient will be able to maintain standing balance unsupported stance for at least 30 sec with narrow PENNY in order to perform self care tasks with increased ease. Status at last recert: up to 30 sec with wide PENNY; 12 sec with wide PENNY, 10 sec with narrow PENNY at date of recert  4. Patient will be able to stand for at least 5 min to complete exercises in order to perform daily tasks with increased ease.    Status at last recert: frequent rest breaks with standing activities at time of therapy visits  5. Patient will improve TUG score by at least 5 sec in order to demonstrate improved functional mobility. Status at last recert: to be tested at next follow up visit  6. Patient will be able to ambulate at least 300 ft without rest break in order to improved tolerance to community ambulation. Status at last recert: [id number] x2    Frequency / Duration: Patient to be seen 2-3 times per week for 4 weeks:    Assessment / Recommendations:Patient making slow, steady progress towards goals in therapy. She reports subjective improvements with balance and improved strength. Patient reports improved endurance with ability to clean her tub while sitting in her shower chair. Patient continues to ambulate with rollator for home and community ambulation. She continues to report global muscle tightness but that she does some stretching at home. FOTO score remains unchanged at 40/100 points. She was able to maintain unsupported standing for 12 sec with wide PENNY and 10 sec with narrow PENNY this visit but has been able to maintain position with wide PENNY for 30 sec in previous visits. She demonstrates improved functional mobility with improved 5x sit-to-stand test score of 32 sec; she continues to require B UE support to complete activity was able to perform transfer with only left UE support. She continues to require multiple seated rest breaks with standing activities during therapy session. She requires min A to don AFO to left shoe (with assistance required to place foot into shoe with AFO placed in it but able to self manage all other steps). Patient demonstrates impaired core strength/control evidenced by difficulty with dynamic activities while seated on uneven surface. Endurance remains limited with patient able to ambulate 100ft with rollator without needing rest break during therapy sessions. Patient would benefit from continued skilled outpatient PT to address remaining deficits and improve overall QOL. Certification Period: 9/9/22-10/8/22    Mahad Henry, PT 9/8/2022 6:47 PM    ________________________________________________________________________  I certify that the above Therapy Services are being furnished while the patient is under my care. I agree with the treatment plan and certify that this therapy is necessary. [] I have read the above and request that my patient continue as recommended.   [] I have read the above report and request that my patient continue therapy with the following changes/special instructions: _______________________________________  [] I have read the above report and request that my patient be discharged from therapy    Physician's Signature:____________Date:_________TIME:________     Damien May, NP  ** Signature, Date and Time must be completed for valid certification **    Please sign and return to [facility]  [address] [place] [address]  [phone] fax

## 2022-09-12 ENCOUNTER — HOSPITAL ENCOUNTER (OUTPATIENT)
Dept: PHYSICAL THERAPY | Age: 32
Discharge: HOME | End: 2022-09-12
Payer: MEDICARE

## 2022-09-12 PROCEDURE — 97530 THERAPEUTIC ACTIVITIES: CPT

## 2022-09-12 PROCEDURE — 97110 THERAPEUTIC EXERCISES: CPT

## 2022-09-12 NOTE — PROGRESS NOTES
PT DAILY TREATMENT NOTE     Patient Name: Guillermina Arellano  Date:2022  : 1990  [x]  Patient  Verified  Payor: Michael Henry / Plan: [place] MEDICARE PART A & B / Product Type: Medicare /    In time:4:48P  Out time:5:30P  Total Treatment Time (min): 42  Visit #: 1 of 12    Medicare/BCBS Only   Total Timed Codes (min):  42 1:1 Treatment Time:  42       Treatment Area: Other abnormalities of gait and mobility [R26.89]  Weakness of right lower extremity [R29.898]  Weakness of left lower extremity [R29.898]    SUBJECTIVE  Pain Level (0-10 scale): 0/10  Any medication changes, allergies to medications, adverse drug reactions, diagnosis change, or new procedure performed?: [x] No    [] Yes (see summary sheet for update)  Subjective functional status/changes:   [] No changes reported  Patient reports she had difficulty getting in and out of the truck. She was able to get her AFO on herself today. She had trouble lifting her leg to get in and out. She does not feel tight today but still plans to ask her doctor about upping her baclofen dosage.      OBJECTIVE      16 min Therapeutic Exercise:  [x] See flow sheet :    Rationale: increase ROM, increase strength, improve coordination, improve balance, and increase proprioception to improve the patients ability to perform ADLs with increased ease    26 min Therapeutic Activity:  [x]  See flow sheet : TUG, car transfer, patient and family education   Rationale: increase ROM, increase strength, improve coordination, improve balance, and increase proprioception  to improve the patients ability to perform ADLs with increased ease and determine therapy plan             With   [] TE   [] TA   [] neuro   [] other: Patient Education: [x] Review HEP    [] Progressed/Changed HEP based on:   [] positioning   [] body mechanics   [] transfers   [] heat/ice application    [] other:      Other Objective/Functional Measures:     Session abbreviated due to patient arriving >15 min late to appt  Difficulty with marches on left after 3 reps requiring manual assist  Left knee hyperextension with marching and step ups on right  Educated on \"up with the good and down with the bad\" with stepping up/down    TUG: trial 1 65 sec; patient deferred additional trials secondary to fatigue and having to ambulate to truck then enter vehicle    Patient deferred WC for mobility to get back to vehicle     Car transfer into [facility]    Trialed 2\" step with patient stepping up leading with right but not tall enough; trialed 4\" step with patietn stepping up leading with right but unable to step up to 1st running board-unable to do so  Then trialed stepping onto second runnign board with right-patient fell forward into seat  Successful with step to pattern to 4\" step then 1st running board then floor of truck but then fell into seat in side sitting-educated on proper foot placement and keeping right foot farther from seat to allow room for left foot to step up  Several rest breaks with activity    Educated patient /family on using stool for entering/exiting and keeping rollator close/letting family know if needing to sit for safety    Pain Level (0-10 scale) post treatment: 0/10    ASSESSMENT/Changes in Function: Patient making slow, steady progress towards goals. Patient most limited this visit due to increased left LE weakness/instability evidenced by left genu recurvatum with activities involving left SLS. She demonstrates decreased functional mobility evidenced by increased time to complete TUG test. She continues to demonstrate decreased safety awareness and requires frequent verbal cues for hand placement with transfers, keeping rollator close with ambulation/transfers, and to advise therapist(s)/family if seated rest break needed due to fatigue. Bilateral LE strength limited contributing to difficulty with transfers to/from varying heights/elevations.      Patient will continue to benefit from skilled PT services to modify and progress therapeutic interventions, address functional mobility deficits, address ROM deficits, address strength deficits, analyze and address soft tissue restrictions, analyze and cue movement patterns, analyze and modify body mechanics/ergonomics, assess and modify postural abnormalities, address imbalance/dizziness, and instruct in home and community integration to attain remaining goals. Progress towards goals / Updated goals:  1. Patient will improve FOTO score to at least 50/100 points in order to demonstrate functional improvement. Status at last recert: 72/099  2. Patient will report no more than \"limited a little\" with \"walking one block\" with FOTO in order to demonstrate improved tolerance to ambulation. Status at last recert: \"limited a lot\"  3. Patient will be able to maintain standing balance unsupported stance for at least 30 sec with narrow PENNY in order to perform self care tasks with increased ease. Status at last recert: up to 30 sec with wide PENNY; 12 sec with wide PENNY, 10 sec with narrow PENNY at date of recert  4. Patient will be able to stand for at least 5 min to complete exercises in order to perform daily tasks with increased ease. Status at last recert: frequent rest breaks with standing activities at time of therapy visits  5. Patient will improve TUG score by at least 5 sec in order to demonstrate improved functional mobility. Progressing-65 sec (9/12/22)               Status at last recert: to be tested at next follow up visit  6. Patient will be able to ambulate at least 300 ft without rest break in order to improved tolerance to community ambulation.                Status at last recert: [id number] x2    PLAN  []  Upgrade activities as tolerated     []  Continue plan of care  []  Update interventions per flow sheet       []  Discharge due to:_  []  Other:_      Doc Marcano, PT 9/12/2022  12:28 PM    Future Appointments   Date Time Provider Brady Bates   9/12/2022  4:30 PM Christopher Herndon [facility]   9/14/2022  4:30 PM Kuldip Pinto, PT [facility]   9/19/2022  4:30 PM Clem Weber, PT [facility]   9/21/2022  4:30 PM Kuldip Pinto, PT [facility]   9/26/2022  4:30 PM Christopher Herndon [facility]   9/28/2022  4:30 PM Sh[facility]   10/3/2022  4:30 PM Kuldip Pinto, PT [facility]   10/5/2022  4:30 PM Rosalee Juarez, PT [facility]

## 2022-09-19 ENCOUNTER — HOSPITAL ENCOUNTER (OUTPATIENT)
Dept: PHYSICAL THERAPY | Age: 32
Discharge: HOME | End: 2022-09-19
Payer: MEDICARE

## 2022-09-19 PROCEDURE — 97530 THERAPEUTIC ACTIVITIES: CPT

## 2022-09-19 PROCEDURE — 97112 NEUROMUSCULAR REEDUCATION: CPT

## 2022-09-19 PROCEDURE — 97110 THERAPEUTIC EXERCISES: CPT

## 2022-09-19 NOTE — PROGRESS NOTES
PT DAILY TREATMENT NOTE     Patient Name: Lesvia Dunaway  Date:2022  : 1990  [x]  Patient  Verified  Payor: Alondra Nuñez / Plan: [place] MED[place] & B / Product Type: Medicare /    In time: 4:33  Out time: 5:24  Total Treatment Time (min): 51  Visit #: 2 of 12    Medicare/BCBS Only   Total Timed Codes (min):  51 1:1 Treatment Time:  47       Treatment Area: Other abnormalities of gait and mobility [R26.89]  Weakness of right lower extremity [R29.898]  Weakness of left lower extremity [R29.898]    SUBJECTIVE  Pain Level (0-10 scale): 0/10  Any medication changes, allergies to medications, adverse drug reactions, diagnosis change, or new procedure performed?: [x] No    [] Yes (see summary sheet for update)  Subjective functional status/changes:   [] No changes reported  Pt. Reports she is a little tired from washing dishes today. OBJECTIVE    31 min Therapeutic Exercise:  [x] See flow sheet :   Rationale: increase ROM and increase strength to improve the patients ability to ambulate     10 min Therapeutic Activity:  []  See flow sheet : sit to stands, car transfer   Rationale: increase strength, improve coordination, and improve balance  to improve the patients ability to transfer      10 min Neuromuscular Re-education:  []  See flow sheet : standing balance activities, irvin disc sit   Rationale: increase strength, improve coordination, and improve balance  to improve the patients ability to perform ADLs          With   [x] TE   [] TA   [] neuro   [] other: Patient Education: [x] Review HEP    [] Progressed/Changed HEP based on:   [] positioning   [] body mechanics   [] transfers   [] heat/ice application    [] other:      Other Objective/Functional Measures:   Standing tolerance: improving with ability to wash dishes today.    Pt. Required 3 rest breaks walking from table next to pulleys to car in parking lot  CGA for car transfer  Some improvement in heel strike with verbal and visual cues but decreases with fatigue  Pt. Required multiple cues to stay within walker especially as she fatigues and during turns  She was educated on fwd weight shift for sit to stand transfers  Balance on irvin disc was ~ 3 seconds     Pain Level (0-10 scale) post treatment: 0/10    ASSESSMENT/Changes in Function: pt. Is making slow progress towards goals. She continues to be limited by LE weakness and fatigue that is affecting her ambulation. She continues to ambulate with wide PENNY and foot drag on left side that worsens as she fatigues. She continues to have limited standing and ambulation tolerance. Patient will continue to benefit from skilled PT services to modify and progress therapeutic interventions, address functional mobility deficits, address ROM deficits, address strength deficits, analyze and address soft tissue restrictions, analyze and cue movement patterns, analyze and modify body mechanics/ergonomics, assess and modify postural abnormalities, and address imbalance/dizziness to attain remaining goals. Progress towards goals / Updated goals:  1. Patient will improve FOTO score to at least 50/100 points in order to demonstrate functional improvement. Status at last recert: 59/671  2. Patient will report no more than \"limited a little\" with \"walking one block\" with FOTO in order to demonstrate improved tolerance to ambulation. Status at last recert: \"limited a lot\"  3. Patient will be able to maintain standing balance unsupported stance for at least 30 sec with narrow PENNY in order to perform self care tasks with increased ease. Status at last recert: up to 30 sec with wide PENNY; 12 sec with wide PENNY, 10 sec with narrow PENNY at date of recert  4. Patient will be able to stand for at least 5 min to complete exercises in order to perform daily tasks with increased ease.                Status at last recert: frequent rest breaks with standing activities at time of therapy visits  Progressing: pt. Was able to wash dishes at home (9/19/22)  5. Patient will improve TUG score by at least 5 sec in order to demonstrate improved functional mobility. Progressing-65 sec (9/12/22)               Status at last recert: to be tested at next follow up visit  6. Patient will be able to ambulate at least 300 ft without rest break in order to improved tolerance to community ambulation.                Status at last recert: 148XD x2    PLAN  []  Upgrade activities as tolerated     [x]  Continue plan of care  []  Update interventions per flow sheet       []  Discharge due to:_  []  Other:_      Jairo Patel, PT 9/19/2022  7:52 AM    Future Appointments   Date Time Provider Brady Bates   9/19/2022  4:30 PM Misael Casas, PT [facility]   9/21/2022  4:30 PM Gilberto Sepulveda, PT [facility]   9/26/2022  4:30 PM Hudgins Frankey Proctor, PT [facility]   9/28/2022  4:30 PM Yamile [facility]   10/3/2022  4:30 PM Gilberto Sepulveda, PT [facility]   10/5/2022  4:30 PM Hudgins Frankey Proctor, PT [facility]

## 2022-09-21 ENCOUNTER — HOSPITAL ENCOUNTER (OUTPATIENT)
Dept: PHYSICAL THERAPY | Age: 32
Discharge: HOME | End: 2022-09-21
Payer: MEDICARE

## 2022-09-21 PROCEDURE — 97112 NEUROMUSCULAR REEDUCATION: CPT

## 2022-09-21 PROCEDURE — 97530 THERAPEUTIC ACTIVITIES: CPT

## 2022-09-21 PROCEDURE — 97110 THERAPEUTIC EXERCISES: CPT

## 2022-09-21 NOTE — PROGRESS NOTES
PT DAILY TREATMENT NOTE - [facility]     Patient Name: Paul Dumont  Date:2022  : 1990  [x]  Patient  Verified  Payor: Dionte Carranza / Plan: [place] MEDICARE PART A & B / Product Type: Medicare /    In time:440  Out time:318  Total Treatment Time (min): 38  Visit #: 3 of 12    Treatment Area: Other abnormalities of gait and mobility [R26.89]  Weakness of right lower extremity [R29.898]  Weakness of left lower extremity [R29.898]    SUBJECTIVE  Pain Level (0-10 scale): 0/10  Any medication changes, allergies to medications, adverse drug reactions, diagnosis change, or new procedure performed?: [x] No    [] Yes (see summary sheet for update)  Subjective functional status/changes:   [] No changes reported  Reports she did dishes yesterday that involves standing up at the sink . OBJECTIVE         20 min Therapeutic Exercise:  [] See flow sheet :   Rationale: increase ROM, increase strength, improve coordination, and improve balance to improve the patients ability to ease with ad's     8 min Therapeutic Activity:  []  See flow sheet :   Rationale: increase ROM, increase strength, improve coordination, and improve balance  to improve the patients ability to dec  fall risk     10 min Neuromuscular Re-education:  []  See flow sheet :   Rationale: increase ROM, increase strength, and improve balance  to improve the patients ability to ease with adl's        With   [] TE   [] TA   [] neuro   [] other: Patient Education: [x] Review HEP    [] Progressed/Changed HEP based on:   [] positioning   [] body mechanics   [] transfers   [] heat/ice application    [] other:      Other Objective/Functional Measures:   Performed seated 1/2 foam stretches to decrease back tightness from use of AD and poor postural alignment. Pain Level (0-10 scale) post treatment: 0/10  - improved vilma clearance with  2# over high vilma.    -outdoor ambulation x 30 ft to car  - performed sit to stand with cues for ease of transfers with less effort. Pain Level (0-10 scale) post treatment: 0/10    ASSESSMENT/Changes in Function: Pt encouraged to stretch lumbar spine and T/S today on foam roll due to ongoing tightness form heavy use of bilateral UE's with AD. Patient will continue to benefit from skilled PT services to modify and progress therapeutic interventions, address functional mobility deficits, address ROM deficits, address strength deficits, analyze and address soft tissue restrictions, analyze and cue movement patterns, analyze and modify body mechanics/ergonomics, assess and modify postural abnormalities, address imbalance/dizziness, and instruct in home and community integration to attain remaining goals. []  See Plan of Care  [x]  See progress note/recertification  []  See Discharge Summary         Progress towards goals / Updated goals:    1. Patient will improve FOTO score to at least 50/100 points in order to demonstrate functional improvement. Status at last recert: 91/009  2. Patient will report no more than \"limited a little\" with \"walking one block\" with FOTO in order to demonstrate improved tolerance to ambulation. Status at last recert: \"limited a lot\"  3. Patient will be able to maintain standing balance unsupported stance for at least 30 sec with narrow PENNY in order to perform self care tasks with increased ease. Status at last recert: up to 30 sec with wide PENNY; 12 sec with wide PENNY, 10 sec with narrow PENNY at date of recert  4. Patient will be able to stand for at least 5 min to complete exercises in order to perform daily tasks with increased ease. Status at last recert: frequent rest breaks with standing activities at time of therapy visits  Progressing: pt. Was able to wash dishes at home (9/19/22)  5. Patient will improve TUG score by at least 5 sec in order to demonstrate improved functional mobility.               Progressing-65 sec (9/12/22) Status at last recert: to be tested at next follow up visit  6. Patient will be able to ambulate at least 300 ft without rest break in order to improved tolerance to community ambulation.                Status at last recert: 756KC x2  PLAN  []  Upgrade activities as tolerated     []  Continue plan of care  []  Update interventions per flow sheet       []  Discharge due to:_  []  Other:_      Park Hui PT 9/21/2022  4:48 PM    Future Appointments   Date Time Provider Brady Bates   9/26/2022  4:30 PM Rosalee Mason [place] [facility]   9/28/2022  4:30 PM Nya Higashi [facility]   10/3/2022  4:30 PM Bel Espinal PT [facility]   10/5/2022  4:30 PM Rosalee Mason PT [facility]

## 2022-09-26 ENCOUNTER — TELEPHONE (OUTPATIENT)
Dept: PHYSICAL THERAPY | Age: 32
End: 2022-09-26

## 2022-09-28 ENCOUNTER — HOSPITAL ENCOUNTER (OUTPATIENT)
Dept: PHYSICAL THERAPY | Age: 32
Discharge: HOME | End: 2022-09-28
Payer: MEDICARE

## 2022-09-28 PROCEDURE — 97110 THERAPEUTIC EXERCISES: CPT

## 2022-09-28 PROCEDURE — 97112 NEUROMUSCULAR REEDUCATION: CPT

## 2022-09-28 PROCEDURE — 97530 THERAPEUTIC ACTIVITIES: CPT

## 2022-09-28 NOTE — PROGRESS NOTES
PT DAILY TREATMENT NOTE     Patient Name: Chong Hoskins  Date:2022  : 1990  [x]  Patient  Verified  Payor: Kayla Hassan / Plan: [place] MEDICARE PART A & B / Product Type: Medicare /    In time:433  Out time:523  Total Treatment Time (min): 50  Visit #: 4 of 12    Medicare/BCBS Only   Total Timed Codes (min):  50 1:1 Treatment Time:  50       Treatment Area: Other abnormalities of gait and mobility [R26.89]  Weakness of right lower extremity [R29.898]  Weakness of left lower extremity [R29.898]    SUBJECTIVE  Pain Level (0-10 scale): 0  Any medication changes, allergies to medications, adverse drug reactions, diagnosis change, or new procedure performed?: [x] No    [] Yes (see summary sheet for update)  Subjective functional status/changes:   [] No changes reported  Patient reports that she would ultimately like to return to using a quad cane to walk. She presents to clinic using a rollator, which she states that she typically uses in her home and sometimes uses in the community. She receives a second infusion on Monday so she is going to call the  to reschedule that appointment. OBJECTIVE      22 min Therapeutic Activity:  [x]  See flow sheet : foto reassessment   Rationale: increase ROM, increase strength, improve coordination, and increase proprioception  to improve the patients ability to complete functional activities with ease. 20 min Neuromuscular Re-education:  [x]  See flow sheet : balance activities and assessment   Rationale: increase ROM, increase strength, improve coordination, improve balance, and increase proprioception  to improve the patients ability to improve biomechanics and increase kinesthetic awareness for ease of ADL completion.      8 min Self Care/Home Management: patient educated on:locking rollator during transfer activities, maintaining body within rollator while ambulating, and appropriate timing of PT visits depending on infusion appointments for energy conservation. Rationale:  to educate patient   to improve the patients ability to improve safe, independent ambulation and energy conservation. With   [] TE   [] TA   [] neuro   [x] other: Patient Education: [x] Review HEP    [] Progressed/Changed HEP based on:   [] positioning   [] body mechanics   [] transfers   [] heat/ice application    [] other: patient educated on locking rollator when completing transfer activities; she verbalized understanding. Other Objective/Functional Measures:   -patient presents to clinic using rollator with decreased left hip flexion and decreased Bankle dorsiflexion, pronounced left foot drag exhibited along with intermittent left hip circumduction.      -unable to complete warmup on bike despite use of two wraps to attempt to maintain LEs on bike pedals.     -added half stance balance on 6\" step.      -patient requires frequent cueing to maintain body within rollator    -extended rest breaks necessary throughout session    -patient educated heavily on locking rollator during sit to stand transfer activities and maintain body within rollator while ambulating; while she verbalized understanding, carryover was fair-poor. -CGA and gait belt used throughout today's activities. -patient ambulated approximately 40 feet from clinic to car in parking lot using rollator and negotiating 1 curb. Pain Level (0-10 scale) post treatment: 0    ASSESSMENT/Changes in Function: Today's session focused heavily on patient education to improve safety of ambulation as well as reassessment of balance and functional, dynamic gait activities. Patient continues to exhibit increased gait abnormalities, including: decreased left hip flexion and decreased Bankle dorsiflexion, pronounced left foot drag exhibited along with intermittent left hip circumduction as well as fair-poor carryover post education on safe rollator transfers and use and fatigues quickly.      Patient will continue to benefit from skilled PT services to modify and progress therapeutic interventions, address functional mobility deficits, address ROM deficits, address strength deficits, analyze and address soft tissue restrictions, analyze and cue movement patterns, analyze and modify body mechanics/ergonomics, assess and modify postural abnormalities, address imbalance/dizziness, and instruct in home and community integration to attain remaining goals. []  See Plan of Care  []  See progress note/recertification  []  See Discharge Summary         Progress towards goals / Updated goals:  1. Patient will improve FOTO score to at least 50/100 points in order to demonstrate functional improvement. Status at last recert: 49/404  Current status: nearly met, foto = 49 9/28/22    2. Patient will report no more than \"limited a little\" with \"walking one block\" with FOTO in order to demonstrate improved tolerance to ambulation. Status at last recert: \"limited a lot\"  Current status: no change 9/28/22    3. Patient will be able to maintain standing balance unsupported stance for at least 30 sec with narrow PENNY in order to perform self care tasks with increased ease. Status at last recert: up to 30 sec with wide PENNY; 12 sec with wide PENNY, 10 sec with narrow PENNY at date of recert  Current status: 30 secs with NBOS completed x 1. 9/28/22    4. Patient will be able to stand for at least 5 min to complete exercises in order to perform daily tasks with increased ease. Status at last recert: frequent rest breaks with standing activities at time of therapy visits  Progressing: pt. Was able to wash dishes at home (9/19/22)    5. Patient will improve TUG score by at least 5 sec in order to demonstrate improved functional mobility. Progressing-65 sec (9/12/22)               current status: regressing, TUG = 1 min, 51 secs. 9/28/22    6.  Patient will be able to ambulate at least 300 ft without rest break in order to improved tolerance to community ambulation.                Status at last recert: 318BN x2    PLAN  [x]  Upgrade activities as tolerated     [x]  Continue plan of care  []  Update interventions per flow sheet       []  Discharge due to:_  []  Other:_      Ruby Patel, MARI 9/28/2022  8:55 AM    Future Appointments   Date Time Provider Brady Bates   9/28/2022  4:30 PM Cheri Leonard [facility]   10/3/2022  4:30 PM Toya Bar, PT [facility]   10/5/2022  4:30 PM Rosalee Dias, PT [facility]

## 2022-10-05 ENCOUNTER — APPOINTMENT (OUTPATIENT)
Dept: PHYSICAL THERAPY | Age: 32
End: 2022-10-05

## 2022-10-07 ENCOUNTER — TELEPHONE (OUTPATIENT)
Dept: PHYSICAL THERAPY | Age: 32
End: 2022-10-07

## 2024-12-06 ENCOUNTER — APPOINTMENT (OUTPATIENT)
Facility: HOSPITAL | Age: 34
End: 2024-12-06
Payer: MEDICARE

## 2024-12-06 ENCOUNTER — ANESTHESIA (OUTPATIENT)
Facility: HOSPITAL | Age: 34
End: 2024-12-06
Payer: MEDICARE

## 2024-12-06 ENCOUNTER — HOSPITAL ENCOUNTER (OUTPATIENT)
Facility: HOSPITAL | Age: 34
Setting detail: OBSERVATION
LOS: 1 days | Discharge: HOME | End: 2024-12-07
Attending: EMERGENCY MEDICINE | Admitting: SURGERY
Payer: MEDICARE

## 2024-12-06 ENCOUNTER — ANESTHESIA EVENT (OUTPATIENT)
Facility: HOSPITAL | Age: 34
End: 2024-12-06
Payer: MEDICARE

## 2024-12-06 DIAGNOSIS — R10.11 ABDOMINAL PAIN, RIGHT UPPER QUADRANT: ICD-10-CM

## 2024-12-06 DIAGNOSIS — K81.0 ACUTE CHOLECYSTITIS: Primary | ICD-10-CM

## 2024-12-06 DIAGNOSIS — Z90.49 S/P LAPAROSCOPIC CHOLECYSTECTOMY: ICD-10-CM

## 2024-12-06 LAB
ALBUMIN SERPL-MCNC: 3.5 G/DL (ref 3.4–5)
ALBUMIN/GLOB SERPL: 0.8 (ref 0.8–1.7)
ALP SERPL-CCNC: 109 U/L (ref 45–117)
ALT SERPL-CCNC: 15 U/L (ref 13–56)
ANION GAP SERPL CALC-SCNC: 8 MMOL/L (ref 3–18)
APPEARANCE UR: CLEAR
AST SERPL-CCNC: 16 U/L (ref 10–38)
BACTERIA URNS QL MICRO: NEGATIVE /HPF
BASOPHILS # BLD: 0.1 K/UL (ref 0–0.1)
BASOPHILS NFR BLD: 0 % (ref 0–2)
BILIRUB SERPL-MCNC: 0.6 MG/DL (ref 0.2–1)
BILIRUB UR QL: NEGATIVE
BUN SERPL-MCNC: 10 MG/DL (ref 7–18)
BUN/CREAT SERPL: 12 (ref 12–20)
CALCIUM SERPL-MCNC: 9.1 MG/DL (ref 8.5–10.1)
CHLORIDE SERPL-SCNC: 103 MMOL/L (ref 100–111)
CO2 SERPL-SCNC: 24 MMOL/L (ref 21–32)
COLOR UR: YELLOW
CREAT SERPL-MCNC: 0.81 MG/DL (ref 0.6–1.3)
DIFFERENTIAL METHOD BLD: ABNORMAL
EOSINOPHIL # BLD: 0.1 K/UL (ref 0–0.4)
EOSINOPHIL NFR BLD: 1 % (ref 0–5)
EPITH CASTS URNS QL MICRO: ABNORMAL /LPF (ref 0–5)
ERYTHROCYTE [DISTWIDTH] IN BLOOD BY AUTOMATED COUNT: 16.6 % (ref 11.6–14.5)
GLOBULIN SER CALC-MCNC: 4.4 G/DL (ref 2–4)
GLUCOSE SERPL-MCNC: 108 MG/DL (ref 74–99)
GLUCOSE UR STRIP.AUTO-MCNC: NEGATIVE MG/DL
HCG UR QL: NEGATIVE
HCT VFR BLD AUTO: 35.5 % (ref 35–45)
HGB BLD-MCNC: 10.7 G/DL (ref 12–16)
HGB UR QL STRIP: ABNORMAL
HYALINE CASTS URNS QL MICRO: ABNORMAL /LPF (ref 0–2)
IMM GRANULOCYTES # BLD AUTO: 0.1 K/UL (ref 0–0.04)
IMM GRANULOCYTES NFR BLD AUTO: 0 % (ref 0–0.5)
KETONES UR QL STRIP.AUTO: 40 MG/DL
LACTATE SERPL-SCNC: 0.9 MMOL/L (ref 0.4–2)
LEUKOCYTE ESTERASE UR QL STRIP.AUTO: NEGATIVE
LIPASE SERPL-CCNC: 32 U/L (ref 13–75)
LYMPHOCYTES # BLD: 1.7 K/UL (ref 0.9–3.6)
LYMPHOCYTES NFR BLD: 10 % (ref 21–52)
MCH RBC QN AUTO: 22.5 PG (ref 24–34)
MCHC RBC AUTO-ENTMCNC: 30.1 G/DL (ref 31–37)
MCV RBC AUTO: 74.6 FL (ref 78–100)
MONOCYTES # BLD: 1.1 K/UL (ref 0.05–1.2)
MONOCYTES NFR BLD: 6 % (ref 3–10)
MUCOUS THREADS URNS QL MICRO: ABNORMAL /LPF
NEUTS SEG # BLD: 14.5 K/UL (ref 1.8–8)
NEUTS SEG NFR BLD: 83 % (ref 40–73)
NITRITE UR QL STRIP.AUTO: NEGATIVE
NRBC # BLD: 0 K/UL (ref 0–0.01)
NRBC BLD-RTO: 0 PER 100 WBC
PH UR STRIP: 5.5 (ref 5–8)
PLATELET # BLD AUTO: 481 K/UL (ref 135–420)
PMV BLD AUTO: 9.2 FL (ref 9.2–11.8)
POTASSIUM SERPL-SCNC: 4 MMOL/L (ref 3.5–5.5)
PROT SERPL-MCNC: 7.9 G/DL (ref 6.4–8.2)
PROT UR STRIP-MCNC: ABNORMAL MG/DL
RBC # BLD AUTO: 4.76 M/UL (ref 4.2–5.3)
RBC #/AREA URNS HPF: ABNORMAL /HPF (ref 0–5)
SODIUM SERPL-SCNC: 135 MMOL/L (ref 136–145)
SP GR UR REFRACTOMETRY: 1.02 (ref 1–1.03)
UROBILINOGEN UR QL STRIP.AUTO: 1 EU/DL (ref 0.2–1)
WBC # BLD AUTO: 17.5 K/UL (ref 4.6–13.2)
WBC URNS QL MICRO: ABNORMAL /HPF (ref 0–5)

## 2024-12-06 PROCEDURE — 6360000002 HC RX W HCPCS: Performed by: SURGERY

## 2024-12-06 PROCEDURE — 6360000002 HC RX W HCPCS: Performed by: EMERGENCY MEDICINE

## 2024-12-06 PROCEDURE — 81001 URINALYSIS AUTO W/SCOPE: CPT

## 2024-12-06 PROCEDURE — 96361 HYDRATE IV INFUSION ADD-ON: CPT

## 2024-12-06 PROCEDURE — 87040 BLOOD CULTURE FOR BACTERIA: CPT

## 2024-12-06 PROCEDURE — 83690 ASSAY OF LIPASE: CPT

## 2024-12-06 PROCEDURE — 3600000002 HC SURGERY LEVEL 2 BASE: Performed by: SURGERY

## 2024-12-06 PROCEDURE — 99285 EMERGENCY DEPT VISIT HI MDM: CPT

## 2024-12-06 PROCEDURE — 96365 THER/PROPH/DIAG IV INF INIT: CPT

## 2024-12-06 PROCEDURE — 2580000003 HC RX 258: Performed by: NURSE ANESTHETIST, CERTIFIED REGISTERED

## 2024-12-06 PROCEDURE — 87086 URINE CULTURE/COLONY COUNT: CPT

## 2024-12-06 PROCEDURE — 2709999900 HC NON-CHARGEABLE SUPPLY: Performed by: SURGERY

## 2024-12-06 PROCEDURE — 85025 COMPLETE CBC W/AUTO DIFF WBC: CPT

## 2024-12-06 PROCEDURE — 76705 ECHO EXAM OF ABDOMEN: CPT

## 2024-12-06 PROCEDURE — G0378 HOSPITAL OBSERVATION PER HR: HCPCS

## 2024-12-06 PROCEDURE — 47562 LAPAROSCOPIC CHOLECYSTECTOMY: CPT | Performed by: SURGERY

## 2024-12-06 PROCEDURE — 99222 1ST HOSP IP/OBS MODERATE 55: CPT

## 2024-12-06 PROCEDURE — 80053 COMPREHEN METABOLIC PANEL: CPT

## 2024-12-06 PROCEDURE — 96376 TX/PRO/DX INJ SAME DRUG ADON: CPT

## 2024-12-06 PROCEDURE — 2580000003 HC RX 258: Performed by: STUDENT IN AN ORGANIZED HEALTH CARE EDUCATION/TRAINING PROGRAM

## 2024-12-06 PROCEDURE — 96375 TX/PRO/DX INJ NEW DRUG ADDON: CPT

## 2024-12-06 PROCEDURE — 7100000001 HC PACU RECOVERY - ADDTL 15 MIN: Performed by: SURGERY

## 2024-12-06 PROCEDURE — 6360000002 HC RX W HCPCS: Performed by: STUDENT IN AN ORGANIZED HEALTH CARE EDUCATION/TRAINING PROGRAM

## 2024-12-06 PROCEDURE — 3700000001 HC ADD 15 MINUTES (ANESTHESIA): Performed by: SURGERY

## 2024-12-06 PROCEDURE — 81025 URINE PREGNANCY TEST: CPT

## 2024-12-06 PROCEDURE — 6360000002 HC RX W HCPCS: Performed by: NURSE ANESTHETIST, CERTIFIED REGISTERED

## 2024-12-06 PROCEDURE — 83605 ASSAY OF LACTIC ACID: CPT

## 2024-12-06 PROCEDURE — 3700000000 HC ANESTHESIA ATTENDED CARE: Performed by: SURGERY

## 2024-12-06 PROCEDURE — 88304 TISSUE EXAM BY PATHOLOGIST: CPT

## 2024-12-06 PROCEDURE — 3600000012 HC SURGERY LEVEL 2 ADDTL 15MIN: Performed by: SURGERY

## 2024-12-06 PROCEDURE — 2580000003 HC RX 258: Performed by: SURGERY

## 2024-12-06 PROCEDURE — 7100000000 HC PACU RECOVERY - FIRST 15 MIN: Performed by: SURGERY

## 2024-12-06 PROCEDURE — 2500000003 HC RX 250 WO HCPCS: Performed by: NURSE ANESTHETIST, CERTIFIED REGISTERED

## 2024-12-06 RX ORDER — FENTANYL CITRATE 50 UG/ML
25 INJECTION, SOLUTION INTRAMUSCULAR; INTRAVENOUS EVERY 5 MIN PRN
Status: DISCONTINUED | OUTPATIENT
Start: 2024-12-06 | End: 2024-12-06 | Stop reason: HOSPADM

## 2024-12-06 RX ORDER — ONDANSETRON 2 MG/ML
INJECTION INTRAMUSCULAR; INTRAVENOUS
Status: DISCONTINUED | OUTPATIENT
Start: 2024-12-06 | End: 2024-12-06 | Stop reason: SDUPTHER

## 2024-12-06 RX ORDER — 0.9 % SODIUM CHLORIDE 0.9 %
1000 INTRAVENOUS SOLUTION INTRAVENOUS ONCE
Status: COMPLETED | OUTPATIENT
Start: 2024-12-06 | End: 2024-12-06

## 2024-12-06 RX ORDER — LIDOCAINE HYDROCHLORIDE 20 MG/ML
INJECTION, SOLUTION EPIDURAL; INFILTRATION; INTRACAUDAL; PERINEURAL
Status: DISCONTINUED | OUTPATIENT
Start: 2024-12-06 | End: 2024-12-06 | Stop reason: SDUPTHER

## 2024-12-06 RX ORDER — ROCURONIUM BROMIDE 10 MG/ML
INJECTION, SOLUTION INTRAVENOUS
Status: DISCONTINUED | OUTPATIENT
Start: 2024-12-06 | End: 2024-12-06 | Stop reason: SDUPTHER

## 2024-12-06 RX ORDER — BACLOFEN 10 MG/1
10 TABLET ORAL NIGHTLY
COMMUNITY

## 2024-12-06 RX ORDER — MIDAZOLAM HYDROCHLORIDE 1 MG/ML
INJECTION, SOLUTION INTRAMUSCULAR; INTRAVENOUS
Status: DISCONTINUED | OUTPATIENT
Start: 2024-12-06 | End: 2024-12-06 | Stop reason: SDUPTHER

## 2024-12-06 RX ORDER — ONDANSETRON 2 MG/ML
4 INJECTION INTRAMUSCULAR; INTRAVENOUS
Status: DISCONTINUED | OUTPATIENT
Start: 2024-12-06 | End: 2024-12-06 | Stop reason: HOSPADM

## 2024-12-06 RX ORDER — FENTANYL CITRATE 50 UG/ML
INJECTION, SOLUTION INTRAMUSCULAR; INTRAVENOUS
Status: DISCONTINUED | OUTPATIENT
Start: 2024-12-06 | End: 2024-12-06 | Stop reason: SDUPTHER

## 2024-12-06 RX ORDER — DEXAMETHASONE SODIUM PHOSPHATE 4 MG/ML
INJECTION, SOLUTION INTRA-ARTICULAR; INTRALESIONAL; INTRAMUSCULAR; INTRAVENOUS; SOFT TISSUE
Status: DISCONTINUED | OUTPATIENT
Start: 2024-12-06 | End: 2024-12-06 | Stop reason: SDUPTHER

## 2024-12-06 RX ORDER — SODIUM CHLORIDE 9 MG/ML
INJECTION, SOLUTION INTRAVENOUS
Status: DISCONTINUED | OUTPATIENT
Start: 2024-12-06 | End: 2024-12-06 | Stop reason: SDUPTHER

## 2024-12-06 RX ORDER — SODIUM CHLORIDE 9 MG/ML
INJECTION, SOLUTION INTRAVENOUS CONTINUOUS
Status: DISCONTINUED | OUTPATIENT
Start: 2024-12-06 | End: 2024-12-06

## 2024-12-06 RX ORDER — MORPHINE SULFATE 4 MG/ML
4 INJECTION, SOLUTION INTRAMUSCULAR; INTRAVENOUS
Status: DISCONTINUED | OUTPATIENT
Start: 2024-12-06 | End: 2024-12-07 | Stop reason: HOSPADM

## 2024-12-06 RX ORDER — ONDANSETRON 4 MG/1
4 TABLET, ORALLY DISINTEGRATING ORAL 3 TIMES DAILY PRN
Qty: 21 TABLET | Refills: 0 | Status: SHIPPED | OUTPATIENT
Start: 2024-12-06

## 2024-12-06 RX ORDER — SODIUM CHLORIDE 9 MG/ML
INJECTION, SOLUTION INTRAVENOUS CONTINUOUS
Status: DISCONTINUED | OUTPATIENT
Start: 2024-12-06 | End: 2024-12-06 | Stop reason: HOSPADM

## 2024-12-06 RX ORDER — ESMOLOL HYDROCHLORIDE 10 MG/ML
INJECTION INTRAVENOUS
Status: DISCONTINUED | OUTPATIENT
Start: 2024-12-06 | End: 2024-12-06 | Stop reason: SDUPTHER

## 2024-12-06 RX ORDER — SODIUM CHLORIDE, SODIUM LACTATE, POTASSIUM CHLORIDE, AND CALCIUM CHLORIDE .6; .31; .03; .02 G/100ML; G/100ML; G/100ML; G/100ML
1000 INJECTION, SOLUTION INTRAVENOUS ONCE
Status: COMPLETED | OUTPATIENT
Start: 2024-12-06 | End: 2024-12-06

## 2024-12-06 RX ORDER — MORPHINE SULFATE 2 MG/ML
2 INJECTION, SOLUTION INTRAMUSCULAR; INTRAVENOUS
Status: DISCONTINUED | OUTPATIENT
Start: 2024-12-06 | End: 2024-12-07 | Stop reason: HOSPADM

## 2024-12-06 RX ORDER — SODIUM CHLORIDE, SODIUM LACTATE, POTASSIUM CHLORIDE, AND CALCIUM CHLORIDE .6; .31; .03; .02 G/100ML; G/100ML; G/100ML; G/100ML
1000 INJECTION, SOLUTION INTRAVENOUS ONCE
Status: DISCONTINUED | OUTPATIENT
Start: 2024-12-06 | End: 2024-12-06

## 2024-12-06 RX ORDER — ONDANSETRON 2 MG/ML
4 INJECTION INTRAMUSCULAR; INTRAVENOUS EVERY 6 HOURS PRN
Status: DISCONTINUED | OUTPATIENT
Start: 2024-12-06 | End: 2024-12-07 | Stop reason: HOSPADM

## 2024-12-06 RX ORDER — MORPHINE SULFATE 4 MG/ML
4 INJECTION, SOLUTION INTRAMUSCULAR; INTRAVENOUS
Status: COMPLETED | OUTPATIENT
Start: 2024-12-06 | End: 2024-12-06

## 2024-12-06 RX ORDER — PROPOFOL 10 MG/ML
INJECTION, EMULSION INTRAVENOUS
Status: DISCONTINUED | OUTPATIENT
Start: 2024-12-06 | End: 2024-12-06 | Stop reason: SDUPTHER

## 2024-12-06 RX ORDER — PHENYLEPHRINE HCL IN 0.9% NACL 1 MG/10 ML
SYRINGE (ML) INTRAVENOUS
Status: DISCONTINUED | OUTPATIENT
Start: 2024-12-06 | End: 2024-12-06 | Stop reason: SDUPTHER

## 2024-12-06 RX ORDER — HYDROCODONE BITARTRATE AND ACETAMINOPHEN 5; 325 MG/1; MG/1
1 TABLET ORAL EVERY 4 HOURS PRN
Qty: 30 TABLET | Refills: 0 | Status: SHIPPED | OUTPATIENT
Start: 2024-12-06 | End: 2024-12-11

## 2024-12-06 RX ADMIN — PROPOFOL 150 MG: 10 INJECTION, EMULSION INTRAVENOUS at 16:47

## 2024-12-06 RX ADMIN — SODIUM CHLORIDE 1000 ML: 9 INJECTION, SOLUTION INTRAVENOUS at 02:22

## 2024-12-06 RX ADMIN — DEXAMETHASONE SODIUM PHOSPHATE 4 MG: 4 INJECTION INTRA-ARTICULAR; INTRALESIONAL; INTRAMUSCULAR; INTRAVENOUS; SOFT TISSUE at 16:59

## 2024-12-06 RX ADMIN — SUGAMMADEX 200 MG: 100 INJECTION, SOLUTION INTRAVENOUS at 17:43

## 2024-12-06 RX ADMIN — Medication 200 MCG: at 16:56

## 2024-12-06 RX ADMIN — MORPHINE SULFATE 2 MG: 2 INJECTION, SOLUTION INTRAMUSCULAR; INTRAVENOUS at 19:42

## 2024-12-06 RX ADMIN — SODIUM CHLORIDE: 9 INJECTION, SOLUTION INTRAVENOUS at 17:44

## 2024-12-06 RX ADMIN — FENTANYL CITRATE 25 MCG: 50 INJECTION INTRAMUSCULAR; INTRAVENOUS at 17:46

## 2024-12-06 RX ADMIN — MIDAZOLAM 1 MG: 1 INJECTION, SOLUTION INTRAMUSCULAR; INTRAVENOUS at 16:41

## 2024-12-06 RX ADMIN — PIPERACILLIN AND TAZOBACTAM 3375 MG: 3; .375 INJECTION, POWDER, LYOPHILIZED, FOR SOLUTION INTRAVENOUS at 16:53

## 2024-12-06 RX ADMIN — ESMOLOL HYDROCHLORIDE 5 MG: 10 INJECTION, SOLUTION INTRAVENOUS at 17:22

## 2024-12-06 RX ADMIN — SODIUM CHLORIDE: 9 INJECTION, SOLUTION INTRAVENOUS at 05:07

## 2024-12-06 RX ADMIN — PIPERACILLIN AND TAZOBACTAM 3375 MG: 3; .375 INJECTION, POWDER, LYOPHILIZED, FOR SOLUTION INTRAVENOUS at 09:27

## 2024-12-06 RX ADMIN — FENTANYL CITRATE 50 MCG: 50 INJECTION INTRAMUSCULAR; INTRAVENOUS at 16:47

## 2024-12-06 RX ADMIN — SODIUM CHLORIDE: 9 INJECTION, SOLUTION INTRAVENOUS at 16:41

## 2024-12-06 RX ADMIN — ONDANSETRON 4 MG: 2 INJECTION, SOLUTION INTRAMUSCULAR; INTRAVENOUS at 17:00

## 2024-12-06 RX ADMIN — LIDOCAINE HYDROCHLORIDE 100 MG: 20 INJECTION, SOLUTION EPIDURAL; INFILTRATION; INTRACAUDAL; PERINEURAL at 16:47

## 2024-12-06 RX ADMIN — ESMOLOL HYDROCHLORIDE 5 MG: 10 INJECTION, SOLUTION INTRAVENOUS at 17:16

## 2024-12-06 RX ADMIN — SODIUM CHLORIDE 1000 ML: 9 INJECTION, SOLUTION INTRAVENOUS at 02:13

## 2024-12-06 RX ADMIN — ROCURONIUM BROMIDE 50 MG: 10 INJECTION, SOLUTION INTRAVENOUS at 16:47

## 2024-12-06 RX ADMIN — PIPERACILLIN AND TAZOBACTAM 4500 MG: 4; .5 INJECTION, POWDER, FOR SOLUTION INTRAVENOUS at 01:45

## 2024-12-06 RX ADMIN — MORPHINE SULFATE 4 MG: 4 INJECTION, SOLUTION INTRAMUSCULAR; INTRAVENOUS at 02:30

## 2024-12-06 RX ADMIN — FENTANYL CITRATE 50 MCG: 50 INJECTION INTRAMUSCULAR; INTRAVENOUS at 17:15

## 2024-12-06 RX ADMIN — SODIUM CHLORIDE, POTASSIUM CHLORIDE, SODIUM LACTATE AND CALCIUM CHLORIDE 1000 ML: 600; 310; 30; 20 INJECTION, SOLUTION INTRAVENOUS at 00:33

## 2024-12-06 ASSESSMENT — PAIN DESCRIPTION - DIRECTION: RADIATING_TOWARDS: ABDOMINAL REGION

## 2024-12-06 ASSESSMENT — LIFESTYLE VARIABLES
HOW MANY STANDARD DRINKS CONTAINING ALCOHOL DO YOU HAVE ON A TYPICAL DAY: PATIENT DOES NOT DRINK
HOW OFTEN DO YOU HAVE A DRINK CONTAINING ALCOHOL: NEVER

## 2024-12-06 ASSESSMENT — PAIN DESCRIPTION - DESCRIPTORS
DESCRIPTORS: DISCOMFORT
DESCRIPTORS: ACHING
DESCRIPTORS: DISCOMFORT

## 2024-12-06 ASSESSMENT — PAIN DESCRIPTION - ONSET: ONSET: GRADUAL

## 2024-12-06 ASSESSMENT — ENCOUNTER SYMPTOMS
VOMITING: 0
SHORTNESS OF BREATH: 0
ABDOMINAL PAIN: 1
NAUSEA: 0
CONSTIPATION: 1

## 2024-12-06 ASSESSMENT — PAIN SCALES - GENERAL
PAINLEVEL_OUTOF10: 6
PAINLEVEL_OUTOF10: 0

## 2024-12-06 ASSESSMENT — PAIN DESCRIPTION - ORIENTATION
ORIENTATION: ANTERIOR
ORIENTATION: RIGHT

## 2024-12-06 ASSESSMENT — PAIN - FUNCTIONAL ASSESSMENT
PAIN_FUNCTIONAL_ASSESSMENT: 0-10
PAIN_FUNCTIONAL_ASSESSMENT: 0-10
PAIN_FUNCTIONAL_ASSESSMENT: ACTIVITIES ARE NOT PREVENTED

## 2024-12-06 ASSESSMENT — PAIN DESCRIPTION - LOCATION
LOCATION: ABDOMEN

## 2024-12-06 ASSESSMENT — PAIN DESCRIPTION - FREQUENCY: FREQUENCY: INTERMITTENT

## 2024-12-06 ASSESSMENT — PAIN DESCRIPTION - PAIN TYPE
TYPE: ACUTE PAIN
TYPE: SURGICAL PAIN
TYPE: ACUTE PAIN

## 2024-12-06 NOTE — PROGRESS NOTES
General surgery:    Patient seen and examined. Questions answered. Consent on chart    Ruma Eisenberg,

## 2024-12-06 NOTE — DISCHARGE SUMMARY
activity at your follow up.    Walking is encouraged after surgery.    Stairs are ok to climb.      DIET:    Diet as tolerated. Start with liquids then advance your diet based on how you fell.    No alcoholic beverages for 24 hours after surgery or while on antibiotics or pain mdications.    Drink plenty of water.        MEDICATIONS:    Use daily stool softners (over the counter such as Colace or Senekot) while on pain medications.     Resume pre-operative medications. If you are on any blood thinners see special instructions below.    Use prescriptions given or Tylenol, Ibuprofen as needed for pain.    Do not use more than 4000mg of Tylenol (acetaminophen) per day. Be aware this may be  in your prescription medication as well.    Be aware narcotic prescriptions are tightly controlled in the [facility]. If requiring more than one refill, a follow up appointment will be required.      WOUND CARE:      You have steri strips on your incisions, you may shower in 24 hours and pat dry. Leave steri strips on until they fall off on their own    Do not tub bathe, swim, or soak incisions until cleared to do so at your follow up.    Ice bag to the affected area; 20 minutes on and 20 minutes off if desired.      FOLLOW UP CARE:    You should have an appointment scheduled within 14 days after surgery. If this is not yet scheduled, call the office.  Any forms that you need filled out regarding your medical care can be brought to the office at follow up appointment of faxed to: [phone]        CALL DOCTOR IF:  Temperature is over 101 degrees, a slight fever can be normal 24-48 hour after surgery.  Nausea & vomiting that persists more than 24 hours after surgery.  Your wound appears very red, hot, painful or swollen.  Excessive bleeding occurs form the incision.      *Between the hours 9-5 Monday-Friday please call the office at [phone].     *If there is a medical emergency please go to the nearest emergency room  immediately       No follow-ups on file.    Signed By: Ruma Eisenberg,      December 6, 2024

## 2024-12-06 NOTE — H&P
History & Physical    Date: 12/6/2024    Assessment:    Principal Problem:    Acute cholecystitis  Resolved Problems:    * No resolved hospital problems. *      Plan:   I personally reviewed her gallbladder ultrasound from 12/6/2024 which shows gallstones, gallbladder wall thickening.     We discussed the risks associated with laparoscopic cholecystectomy including bleeding, infection, injury to surrounding structures, bile leak, bile duct injury, and unforseen events including cardiovascular events and stroke.     Laparoscopic cholecystectomy tentatively planned for today  NPO    History of Present Illness:  Ms. Arita is a 34 y.o. year old female with a past medical history significant for asthma, sleep apnea, and multiple sclerosis, presented to the ER this morning with a 2-day history of right upper quadrant pain, decreased appetitive, nausea, and constipation. No vomiting. She denies fevers or chills.  No history of previous abdominal surgeries.       History:  Past Medical History:   Diagnosis Date    Asthma     Sleep apnea      Past Surgical History:   Procedure Laterality Date    ORTHOPEDIC SURGERY Left 2012    ankle    WISDOM TOOTH EXTRACTION  2011     Family History   Problem Relation Age of Onset    Diabetes Maternal Grandmother     Diabetes Mother     Diabetes Father      Social History     Socioeconomic History    Marital status: Single     Spouse name: Not on file    Number of children: Not on file    Years of education: Not on file    Highest education level: Not on file   Occupational History    Not on file   Tobacco Use    Smoking status: Never    Smokeless tobacco: Never   Substance and Sexual Activity    Alcohol use: Yes    Drug use: No    Sexual activity: Not on file   Other Topics Concern    Not on file   Social History Narrative    Not on file     Social Determinants of Health     Financial Resource Strain: Not on file   Food Insecurity: No Food Insecurity (12/6/2024)    Hunger Vital Sign

## 2024-12-06 NOTE — PERIOP NOTE
TRANSFER - IN REPORT:    Verbal report received from MAITE Boateng on Stacia Arita  being received from [address] [place] [address] ordered procedure      Report consisted of patient's Situation, Background, Assessment and   Recommendations(SBAR).     Information from the following report(s) Nurse Handoff Report, Adult Overview, Recent Results, and Procedure Verification was reviewed with the receiving nurse.    Opportunity for questions and clarification was provided.      Assessment to be completed upon patient's arrival to unit and care assumed.   Report given to MAITE Ardon

## 2024-12-06 NOTE — ED NOTES
TRANSFER - OUT REPORT:    Verbal report given to MAITE Arnold on Stacia Arita  being transferred to [facility] for routine progression of patient care       Report consisted of patient's Situation, Background, Assessment and   Recommendations(SBAR).     Information from the following report(s) Nurse Handoff Report was reviewed with the receiving nurse.    [place] Fall Assessment:    Presents to emergency department  because of falls (Syncope, seizure, or loss of consciousness): No  Age > 70: No  Altered Mental Status, Intoxication with alcohol or substance confusion (Disorientation, impaired judgment, poor safety awaremess, or inability to follow instructions): No  Impaired Mobility: Ambulates or transfers with assistive devices or assistance; Unable to ambulate or transer.: Yes (Pt uses rollader at home.)  Nursing Judgement: No          Lines:   Peripheral IV 12/06/24 Right Antecubital (Active)        Opportunity for questions and clarification was provided.      Patient transported with:  Registered Nurse

## 2024-12-06 NOTE — CARE COORDINATION
Virtual reviewer communicated change to CM, which reflect outpatient observation order written prior to Written discharge order. Code 44 delivered and given to patient.CM met with the patient and provided to the patient the printed information about her outpatient observation status. The patient was given the flyer entitled, \"Medicare Outpatient Observation: Information & notification.\" All questions were answered, no additional discharge needs identified at this time and patient expects to return to their (home, ) after discharge today.  Copy provided to patient, and original PRICE Notice placed in patient's chart.         Roro Murphy RN  Case Management [phone]

## 2024-12-06 NOTE — CARE COORDINATION
12/06/24 1224   Service Assessment   Patient Orientation Alert and Oriented;Person;Place;Situation;Self   Cognition Alert   History Provided By Patient   Primary Caregiver Self   Accompanied By/Relationship No one is currently at the bedside with patient at this time.   Support Systems Children;Parent;Family Members   Patient's Healthcare Decision Maker is: Legal Next of Kin   PCP Verified by CM Yes   Last Visit to PCP Within last 3 months   Prior Functional Level Independent in ADLs/IADLs   Current Functional Level Independent in ADLs/IADLs   Can patient return to prior living arrangement Yes   Ability to make needs known: Good   Family able to assist with home care needs: Yes   Would you like for me to discuss the discharge plan with any other family members/significant others, and if so, who? Yes  (Patient's mother,  listed in patient's contact list.)   Financial Resources Medicare;Medicaid   Community Resources None   CM/SW Referral Other (see comment)  (Discharge planning.)   Social/Functional History   Lives With Daughter   Type of Home Apartment   Home Layout One level   Home Access Level entry   Bathroom Shower/Tub Tub/Shower unit   Bathroom Toilet Handicap height   Bathroom Equipment Grab bars in shower;Grab bars around toilet   Bathroom Accessibility Accessible   Home Equipment Rollator   Receives Help From Family   Prior Level of Assist for ADLs Independent   Prior Level of Assist for Homemaking Independent   Homemaking Responsibilities Yes   Ambulation Assistance Independent   Prior Level of Assist for Transfers Independent   Active  No   Patient's  Info Linda Arita, patient's mother, transports patient.   Mode of Transportation Car   Education Not applicable.   Occupation On disability   Type of Occupation Not applicable.   Discharge Planning   Type of Residence Apartment   Living Arrangements Children  (Patient's daughter lives with the patient.)   Current Services Prior To Admission  Durable Medical Equipment   Current DME Prior to Arrival Other (Comment)  (Rollator)   Potential Assistance Needed N/A   DME Ordered? No   Potential Assistance Purchasing Medications No   Type of Home Care Services None   Patient expects to be discharged to: Apartment  (with Family Assistance)   Follow Up Appointment: Best Day/Time  Monday AM  (Patient prefers morning appointments, any day of the week.)   One/Two Story Residence One story   History of falls? 0   Services At/After Discharge   Transition of Care Consult (CM Consult) N/A   Services At/After Discharge None   [place] Resource Information Provided? No   Mode of Transport at Discharge Self  (Patient's mother to transport patient home at time of discharge.)   Hospital Transport Time of Discharge   (TBD on day of discharge.)   Confirm Follow Up Transport Family   Condition of Participation: Discharge Planning   The Plan for Transition of Care is related to the following treatment goals: Patient is not medically stable for discharge at this time.   The Patient and/or Patient Representative was provided with a Choice of Provider?   (Not applicable. No CM needs at this time.)   Freedom of Choice list was provided with basic dialogue that supports the patient's individualized plan of care/goals, treatment preferences, and shares the quality data associated with the providers?  No  (Not applicable. No CM needs at this time.)

## 2024-12-06 NOTE — ED TRIAGE NOTES
Pt arrives via EMS from home cc abdominal pain.     Pt states she has not been able to go for 2 days.   Hx of MS

## 2024-12-06 NOTE — CARE COORDINATION
12/06/24 1221   IMM Letter   Observation Status Letter date given: 12/06/24   Observation Status Letter time given: 1156   Observation Status Letter given to Patient/Family/Significant other/Guardian/POA/by: Roro Murphy

## 2024-12-06 NOTE — PROGRESS NOTES
Advance Care Planning   Healthcare Decision Maker:    Primary Decision Maker: Linda Arita - [facility] - [phone]    Today we documented Decision Maker(s) consistent with Legal Next of Kin hierarchy.       Spiritual Health History and Assessment/Progress Note  [facility]    Loneliness/Social Isolation,  ,  ,      Name: Stacia Arita MRN: [id number]    Age: 34 y.o.     Sex: female   Language: English   Pentecostal: Buddhism   Acute cholecystitis     Date: 12/6/2024            Total Time Calculated: 7 min              Spiritual Assessment began in [address] SURGICAL        Referral/Consult From: Nurse   Encounter Overview/Reason: Loneliness/Social Isolation  Service Provided For: Patient    Qi, Belief, Meaning:   Patient has beliefs or practices that help with coping during difficult times  Family/Friends have beliefs or practices that help with coping during difficult times      Importance and Influence:  Patient has spiritual/personal beliefs that influence decisions regarding their health  Family/Friends have spiritual/personal beliefs that influence decisions regarding the patient's health    Community:  Patient feels well-supported. Support system includes: Parent/s  Family/Friends are connected with a spiritual community:    Assessment and Plan of Care:     Patient Interventions include: Facilitated expression of thoughts and feelings  Family/Friends Interventions include: Facilitated expression of thoughts and feelings    Patient Plan of Care: Spiritual Care available upon further referral  Family/Friends Plan of Care: Spiritual Care available upon further referral    Electronically signed by YANELIS Monahan on 12/6/2024 at 11:40 AM

## 2024-12-06 NOTE — ED PROVIDER NOTES
EMERGENCY DEPARTMENT HISTORY AND PHYSICAL EXAM      Date: 12/6/2024  Patient Name: Stacia Arita    History of Presenting Illness     Chief Complaint   Patient presents with    Abdominal Pain     Independent Histories From: Patient    History (Context): Stacia Arita is a 34 y.o. female who  has a past medical history of Asthma and Sleep apnea.  and multiple sclerosis presenting with concern for abdominal pain and constipation.  Patient reports her symptoms has been present for approximately 2 days in duration with significantly decreased appetite.  Patient localizes her pain to her right upper quadrant.  Patient reports that she has not had a bowel movement in 2 days which is abnormal for her as she usually stools daily.  Patient denies nausea, emesis, fever, chest pain, shortness of breath, dysuria, hematuria.  Patient does report urinary frequency.  Patient utilizes a walker for history of multiple sclerosis and has chronic low back pain and chronic lower extremity weakness. Pt receives disease modifying therpay q6months for her history of MS and denies daily steroid use.    Nursing Notes were all reviewed and agreed with or any disagreements were addressed in the HPI.    PCP: Unknown, Provider, MD    Current Facility-Administered Medications   Medication Dose Route Frequency Provider Last Rate Last Admin    morphine (PF) injection 2 mg  2 mg IntraVENous Q2H PRN Yeshtokin, Ruma, DO        Or    morphine sulfate (PF) injection 4 mg  4 mg IntraVENous Q2H PRN Yeshtokin, Ruma, DO        ondansetron (ZOFRAN) injection 4 mg  4 mg IntraVENous Q6H PRN Yeshtokin, Ruma, DO        0.9 % sodium chloride infusion   IntraVENous Continuous Yeshtokin, Ruma, DO        piperacillin-tazobactam (ZOSYN) 3,375 mg in sodium chloride 0.9 % 50 mL IVPB (mini-bag)  3,375 mg IntraVENous Q8H Yeshtokin, Ruma, DO           Review of Symptoms   Review of Systems    Clinically pertinent ROS as per HPI/MDM/ED course.    Past  Calcium 9.1 8.5 - 10.1 MG/DL    Total Bilirubin 0.6 0.2 - 1.0 MG/DL    ALT 15 13 - 56 U/L    AST 16 10 - 38 U/L    Alk Phosphatase 109 45 - 117 U/L    Total Protein 7.9 6.4 - 8.2 g/dL    Albumin 3.5 3.4 - 5.0 g/dL    Globulin 4.4 (H) 2.0 - 4.0 g/dL    Albumin/Globulin Ratio 0.8 0.8 - 1.7     Lipase    Collection Time: 12/06/24 12:29 AM   Result Value Ref Range    Lipase 32 13 - 75 U/L   Urinalysis    Collection Time: 12/06/24  3:54 AM   Result Value Ref Range    Color, UA YELLOW      Appearance CLEAR      Specific Gravity, UA 1.022 1.005 - 1.030      pH, Urine 5.5 5.0 - 8.0      Protein, UA TRACE (A) NEG mg/dL    Glucose, Ur Negative NEG mg/dL    Ketones, Urine 40 (A) NEG mg/dL    Bilirubin, Urine Negative NEG      Blood, Urine SMALL (A) NEG      Urobilinogen, Urine 1.0 0.2 - 1.0 EU/dL    Nitrite, Urine Negative NEG      Leukocyte Esterase, Urine Negative NEG     Pregnancy, Urine    Collection Time: 12/06/24  3:54 AM   Result Value Ref Range    Pregnancy, Urine Negative NEG     Lactic Acid    Collection Time: 12/06/24  3:54 AM   Result Value Ref Range    Lactic Acid, Plasma 0.9 0.4 - 2.0 MMOL/L      Labs Reviewed   CBC WITH AUTO DIFFERENTIAL - Abnormal; Notable for the following components:       Result Value    WBC 17.5 (*)     Hemoglobin 10.7 (*)     MCV 74.6 (*)     MCH 22.5 (*)     MCHC 30.1 (*)     RDW 16.6 (*)     Platelets 481 (*)     Neutrophils % 83 (*)     Lymphocytes % 10 (*)     Neutrophils Absolute 14.5 (*)     Immature Granulocytes Absolute 0.1 (*)     All other components within normal limits   COMPREHENSIVE METABOLIC PANEL - Abnormal; Notable for the following components:    Sodium 135 (*)     Glucose 108 (*)     Globulin 4.4 (*)     All other components within normal limits   URINALYSIS - Abnormal; Notable for the following components:    Protein, UA TRACE (*)     Ketones, Urine 40 (*)     Blood, Urine SMALL (*)     All other components within normal limits   CULTURE, URINE   CULTURE, BLOOD 1

## 2024-12-06 NOTE — ANESTHESIA PRE PROCEDURE
Department of Anesthesiology  Preprocedure Note       Name:  Stacia Arita   Age:  34 y.o.  :  1990                                          MRN:  [id number]         Date:  2024      Surgeon: Surgeon(s):  Ruma Eisenberg DO    Procedure: Procedure(s):  LAPAROSCOPIC CHOLECYSTECTOMY    Medications prior to admission:   Prior to Admission medications    Medication Sig Start Date End Date Taking? Authorizing Provider   baclofen (LIORESAL) 10 MG tablet Take 1 tablet by mouth at bedtime   Yes Provider, MD Duc   HYDROcodone-acetaminophen (NORCO) 5-325 MG per tablet Take 1 tablet by mouth every 4 hours as needed for Pain for up to 5 days. Intended supply: 5 days. Take lowest dose possible to manage pain Max Daily Amount: 6 tablets 24 Yes Ruma Eisenberg DO   ondansetron (ZOFRAN-ODT) 4 MG disintegrating tablet Take 1 tablet by mouth 3 times daily as needed for Nausea or Vomiting 24  Yes Ruma Eisenberg DO   albuterol sulfate HFA (PROVENTIL;VENTOLIN;PROAIR) 108 (90 Base) MCG/ACT inhaler Inhale 2 puffs into the lungs every 4 hours as needed 10/21/14  Yes Automatic Reconciliation, Ar   amphetamine-dextroamphetamine (ADDERALL) 20 MG tablet Take 1 tablet by mouth 2 times daily.   Yes Automatic Reconciliation, Ar   ergocalciferol (ERGOCALCIFEROL) 1.25 MG (44268 UT) capsule Take 1 capsule by mouth every 7 days   Yes Automatic Reconciliation, Ar   aspirin 325 MG tablet Take 325 mg by mouth daily  Patient not taking: Reported on 12   Automatic Reconciliation, Ar   cyclobenzaprine (FLEXERIL) 5 MG tablet Take 10 mg by mouth 3 times daily (with meals)  Patient not taking: Reported on 2024   Automatic Reconciliation, Ar   guaiFENesin-codeine (TUSSI-ORGANIDIN NR) 100-10 MG/5ML syrup Take 5 mLs by mouth 4 times daily as needed.  Patient not taking: Reported on 2024 10/21/14   Automatic Reconciliation, Ar   naproxen (NAPROSYN) 500 MG tablet Take 500 mg by

## 2024-12-06 NOTE — OP NOTE
Laparoscopic Cholecystectomy Operative Report    Patient Name: Stacia Arita     SURGERY DATE: 24     : 1990     AGE: 34 y.o.     Anesthesiologist: Anesthesiologist: Clinton Rosado MD  CRNA: Aarti Jorge APRN - CRNA     Surgeon: Ruma Eisenberg DO    Anesthesia: General     Surgical assist: Circulator: Lu Daniel RN  Surgical Assistant: Jason Arana Circulator: Bhumi Greene RN  Relief Scrub: Bhumi Salazar  Scrub Person First: Tiffany Arita Surgical Assistant: Prosper Wallace    PreOp DX: Acute cholecystitis [K81.0]     PostOp DX: same     Procedure: Laparoscopic cholecystectomy     Procedure Details: After informed consent was obtained the patient was taken to the operating room and placed in the supine position.  General anesthesia was administered by the anesthetist to titrate to effect.  The abdomen was prepped and draped in the usual sterile fashion and a timeout procedure was performed.  Next using 11 blade scalpel a 5 mm incision was made superior to the umbilicus.  A Veress needle was used to establish pneumoperitoneum and a 5 mm trocar was inserted.  The laparoscope was inserted.  Next under direct visualization 3 additional trochars were placed, one 12 mm in the epigastric region and 2 5 mm along the right costal margin. The gallbladder was then retracted over the liver and out laterally.  A [place] dissector was used to dissect out the cystic duct and artery which were clearly visualized entering the gallbladder. Two clips were placed proximally and one distally each on the duct and artery.  They were then transected with laparoscopic scissors.  Bovie electrocautery was then used to dissect the gallbladder off the liver bed.  The gallbladder was then placed in Endo Catch bag and removed from the epigastric trocar site. The  liver bed was inspected and found to be hemostatic.  The clips were visualized in good position.  Pneumoperitoneum was then

## 2024-12-06 NOTE — DISCHARGE INSTRUCTIONS
Post Operative Discharge Instructions    No driving for 24 hours after surgery and off of prescription pain medication.    Avoid activities that bump or cause jarring movements at the surgical site for 10 days.    No lifting more than 10-15 pounds for 6 weeks after surgery or until cleared for activity at your follow up.    Walking is encouraged after surgery.    Stairs are ok to climb.      DIET:    Diet as tolerated. Start with liquids then advance your diet based on how you fell.    No alcoholic beverages for 24 hours after surgery or while on antibiotics or pain mdications.    Drink plenty of water.        MEDICATIONS:    Use daily stool softners (over the counter such as Colace or Senekot) while on pain medications.     Resume pre-operative medications. If you are on any blood thinners see special instructions below.    Use prescriptions given or Tylenol, Ibuprofen as needed for pain.    Do not use more than 4000mg of Tylenol (acetaminophen) per day. Be aware this may be  in your prescription medication as well.    Be aware narcotic prescriptions are tightly controlled in the [facility]. If requiring more than one refill, a follow up appointment will be required.      WOUND CARE:      You have steri strips on your incisions, you may shower in 24 hours and pat dry. Leave steri strips on until they fall off on their own    Do not tub bathe, swim, or soak incisions until cleared to do so at your follow up.    Ice bag to the affected area; 20 minutes on and 20 minutes off if desired.      FOLLOW UP CARE:    You should have an appointment scheduled within 14 days after surgery. If this is not yet scheduled, call the office.  Any forms that you need filled out regarding your medical care can be brought to the office at follow up appointment of faxed to: [phone]        CALL DOCTOR IF:  Temperature is over 101 degrees, a slight fever can be normal 24-48 hour after surgery.  Nausea & vomiting that persists more

## 2024-12-06 NOTE — CARE COORDINATION
CM spoke with patient at the bedside.   CM verified facesheet and demographics.   No CM needs at this time.   CM updated patient's PCP in patient's chart.   Patient's mother to transport patient home at time of discharge.             Roro Murphy RN  Case Management [phone]

## 2024-12-06 NOTE — PLAN OF CARE
Problem: Discharge Planning  Goal: Discharge to home or other facility with appropriate resources  Outcome: Progressing  Flowsheets (Taken 12/6/2024 0430 by Joe Evans GN)  Discharge to home or other facility with appropriate resources: Identify barriers to discharge with patient and caregiver  Note: Care manager as needed     Problem: Safety - Adult  Goal: Free from fall injury  Outcome: Progressing  Flowsheets (Taken 12/6/2024 0649)  Free From Fall Injury: Instruct family/caregiver on patient safety  Note: Pt to call for amb assist     Problem: Skin/Tissue Integrity - Adult  Goal: Skin integrity remains intact  Outcome: Progressing  Flowsheets (Taken 12/6/2024 0649)  Skin Integrity Remains Intact: Monitor for areas of redness and/or skin breakdown  Note: Keep skin clean and dry     Problem: Musculoskeletal - Adult  Goal: Return mobility to safest level of function  Outcome: Progressing  Flowsheets (Taken 12/6/2024 0649)  Return Mobility to Safest Level of Function: Assess patient stability and activity tolerance for standing, transferring and ambulating with or without assistive devices  Note: Up as tolerated with assist     Problem: Gastrointestinal - Adult  Goal: Minimal or absence of nausea and vomiting  Outcome: Progressing  Flowsheets (Taken 12/6/2024 0649)  Minimal or absence of nausea and vomiting: Administer IV fluids as ordered to ensure adequate hydration  Note: I/O's  Goal: Maintains or returns to baseline bowel function  Outcome: Progressing  Flowsheets (Taken 12/6/2024 0649)  Maintains or returns to baseline bowel function:   Assess bowel function   Administer IV fluids as ordered to ensure adequate hydration  Note: I/O's  Goal: Maintains adequate nutritional intake  Outcome: Progressing  Flowsheets (Taken 12/6/2024 0649)  Maintains adequate nutritional intake: Monitor percentage of each meal consumed  Note: IV while NPO     Problem: Genitourinary - Adult  Goal: Absence of urinary  retention  Outcome: Progressing  Flowsheets (Taken 12/6/2024 9646)  Absence of urinary retention: Assess patient’s ability to void and empty bladder  Note: I/O's. Assess for bladder distention

## 2024-12-07 VITALS
SYSTOLIC BLOOD PRESSURE: 103 MMHG | HEART RATE: 106 BPM | TEMPERATURE: 98.2 F | BODY MASS INDEX: 35.99 KG/M2 | RESPIRATION RATE: 20 BRPM | OXYGEN SATURATION: 96 % | DIASTOLIC BLOOD PRESSURE: 58 MMHG | WEIGHT: 216 LBS | HEIGHT: 65 IN

## 2024-12-07 LAB
BACTERIA SPEC CULT: NORMAL
SERVICE CMNT-IMP: NORMAL

## 2024-12-07 PROCEDURE — G0378 HOSPITAL OBSERVATION PER HR: HCPCS

## 2024-12-07 PROCEDURE — 2580000003 HC RX 258: Performed by: SURGERY

## 2024-12-07 PROCEDURE — 96366 THER/PROPH/DIAG IV INF ADDON: CPT

## 2024-12-07 PROCEDURE — 96376 TX/PRO/DX INJ SAME DRUG ADON: CPT

## 2024-12-07 PROCEDURE — 6360000002 HC RX W HCPCS: Performed by: SURGERY

## 2024-12-07 RX ADMIN — MORPHINE SULFATE 2 MG: 2 INJECTION, SOLUTION INTRAMUSCULAR; INTRAVENOUS at 09:43

## 2024-12-07 RX ADMIN — PIPERACILLIN AND TAZOBACTAM 3375 MG: 3; .375 INJECTION, POWDER, LYOPHILIZED, FOR SOLUTION INTRAVENOUS at 00:18

## 2024-12-07 RX ADMIN — MORPHINE SULFATE 2 MG: 2 INJECTION, SOLUTION INTRAMUSCULAR; INTRAVENOUS at 00:23

## 2024-12-07 RX ADMIN — PIPERACILLIN AND TAZOBACTAM 3375 MG: 3; .375 INJECTION, POWDER, LYOPHILIZED, FOR SOLUTION INTRAVENOUS at 09:32

## 2024-12-07 ASSESSMENT — PAIN SCALES - GENERAL
PAINLEVEL_OUTOF10: 8
PAINLEVEL_OUTOF10: 0
PAINLEVEL_OUTOF10: 6
PAINLEVEL_OUTOF10: 6

## 2024-12-07 ASSESSMENT — PAIN DESCRIPTION - ONSET: ONSET: GRADUAL

## 2024-12-07 ASSESSMENT — PAIN DESCRIPTION - FREQUENCY: FREQUENCY: INTERMITTENT

## 2024-12-07 ASSESSMENT — PAIN DESCRIPTION - DESCRIPTORS
DESCRIPTORS: ACHING
DESCRIPTORS: STABBING
DESCRIPTORS: ACHING;DISCOMFORT

## 2024-12-07 ASSESSMENT — PAIN - FUNCTIONAL ASSESSMENT
PAIN_FUNCTIONAL_ASSESSMENT: PREVENTS OR INTERFERES SOME ACTIVE ACTIVITIES AND ADLS
PAIN_FUNCTIONAL_ASSESSMENT: ACTIVITIES ARE NOT PREVENTED

## 2024-12-07 ASSESSMENT — PAIN DESCRIPTION - PAIN TYPE
TYPE: ACUTE PAIN
TYPE: SURGICAL PAIN

## 2024-12-07 ASSESSMENT — PAIN SCALES - WONG BAKER
WONGBAKER_NUMERICALRESPONSE: NO HURT
WONGBAKER_NUMERICALRESPONSE: NO HURT

## 2024-12-07 ASSESSMENT — PAIN DESCRIPTION - LOCATION
LOCATION: ABDOMEN

## 2024-12-07 ASSESSMENT — PAIN DESCRIPTION - DIRECTION: RADIATING_TOWARDS: ABDOMINAL REGION

## 2024-12-07 ASSESSMENT — PAIN DESCRIPTION - ORIENTATION
ORIENTATION: ANTERIOR
ORIENTATION: UPPER
ORIENTATION: ANTERIOR

## 2024-12-07 NOTE — PROGRESS NOTES
4 Eyes Skin Assessment     NAME:  Stacia Arita  YOB: 1990  MEDICAL RECORD NUMBER:  [id number]    The patient is being assessed for  Shift Handoff    I agree that at least one RN has performed a thorough Head to Toe Skin Assessment on the patient. ALL assessment sites listed below have been assessed.      Areas assessed by both nurses:    Head, Face, Ears, Shoulders, Back, Chest, Arms, Elbows, Hands, Sacrum. Buttock, Coccyx, Ischium, Legs. Feet and Heels, and Under Medical Devices         Does the Patient have a Wound? Yes wound(s) were present on assessment. LDA wound assessment was Initiated and completed by RN       Yoni Prevention initiated by RN: Yes  Wound Care Orders initiated by RN: Yes    Pressure Injury (Stage 3,4, Unstageable, DTI, NWPT, and Complex wounds) if present, place Wound referral order by RN under : Yes    New Ostomies, if present place, Ostomy referral order under : Yes     Nurse 1 eSignature: Electronically signed by Justina Hayden RN on 12/7/24 at 5:32 AM EST    **SHARE this note so that the co-signing nurse can place an eSignature**    Nurse 2 eSignature: {Esignature:[id number]}

## 2024-12-07 NOTE — CARE COORDINATION
Case david received call from Bedside RN Melanie that patient will require medical transport home due to being dependent at baseline.       Update @9318  Patient transport has been scheduled with [facility] transport [phone] spoke with rayshawn who confirmed  time for 3pm to home address on file.    Bedside, RN updated with transport time

## 2024-12-07 NOTE — PROGRESS NOTES
Discharge summary review with patient. Patient verbalized understanding, no concerns voiced. IV  removed  without difficulty;All patient belongings with patient. Patient transported via stretcher  by medical transport for departure.

## 2024-12-07 NOTE — ANESTHESIA POSTPROCEDURE EVALUATION
Department of Anesthesiology  Postprocedure Note    Patient: Stacia Arita  MRN: [id number]  YOB: 1990  Date of evaluation: 12/6/2024    Procedure Summary       Date: 12/06/24 Room / Location: [facility] MAIN 04 / [facility] MAIN OR    Anesthesia Start: 1641 Anesthesia Stop: 1757    Procedure: LAPAROSCOPIC CHOLECYSTECTOMY (Abdomen) Diagnosis:       Acute cholecystitis      (Acute cholecystitis [K81.0])    Surgeons: Ruma Eisenberg DO Responsible Provider: Clinton Rosado MD    Anesthesia Type: General ASA Status: 2 - Emergent            Anesthesia Type: General    Сергей Phase I: Сергей Score: 9    Сергей Phase II:      Anesthesia Post Evaluation    Patient location during evaluation: bedside  Patient participation: complete - patient participated  Level of consciousness: responsive to verbal stimuli  Airway patency: patent  Nausea & Vomiting: no nausea  Respiratory status: acceptable  Hydration status: euvolemic    No notable events documented.

## 2024-12-07 NOTE — PROGRESS NOTES
4 Eyes Skin Assessment     NAME:  Stacia Arita  YOB: 1990  MEDICAL RECORD NUMBER:  [id number]    The patient is being assessed for  Shift Handoff    I agree that at least one RN has performed a thorough Head to Toe Skin Assessment on the patient. ALL assessment sites listed below have been assessed.      Areas assessed by both nurses:    Head, Face, Ears, Shoulders, Back, Chest, Arms, Elbows, Hands, Sacrum. Buttock, Coccyx, Ischium, and Legs. Feet and Heels        Does the Patient have a Wound? No noted wound(s)       Yoni Prevention initiated by RN: No  Wound Care Orders initiated by RN: No    Pressure Injury (Stage 3,4, Unstageable, DTI, NWPT, and Complex wounds) if present, place Wound referral order by RN under : No    New Ostomies, if present place, Ostomy referral order under : No     Nurse 1 eSignature: Electronically signed by GAVIN BRAN RN on 12/6/24 at 8:10 PM EST    **SHARE this note so that the co-signing nurse can place an eSignature**    Nurse 2 eSignature: Electronically signed by Justina Hayden RN on 12/7/24 at 5:31 AM EST

## 2024-12-07 NOTE — PROGRESS NOTES
Patient seen and examined.  She stated that she feels better and she is ready to go home today.  I did get a call yesterday from the nursing team stating that the patient was supposed to go home but she was still in a lot of pain and she wanted to stay so we kept her overnight.  When I saw the patient this morning she stated that she still having pain but has improved remarkably.  She has been having tachycardia before the surgery but her last heart rate is 100.  Her abdomen is soft and nontender.  Will discharge her home today.  All the paperwork's are done.  She will follow-up with Dr. Eisenberg.

## 2024-12-07 NOTE — CARE COORDINATION
Discharge order noted for today. Orders received.   Transport scheduled for 3PM with MMT   No other needs identified at this time.   Case management remains available as needed.

## 2024-12-07 NOTE — PROGRESS NOTES
Patient returned to the floor from PACU. She is awake and alert. No distress noted. Family at bedside and call bell within reach.

## 2024-12-07 NOTE — PLAN OF CARE
Problem: Discharge Planning  Goal: Discharge to home or other facility with appropriate resources  Outcome: Progressing  Flowsheets (Taken 12/6/2024 0740 by Jenni Chery GN)  Discharge to home or other facility with appropriate resources:   Identify barriers to discharge with patient and caregiver   Arrange for needed discharge resources and transportation as appropriate  Note: Identify obstacles to discharge teachings, encourage patient  and care giver to ask questions.     Problem: Safety - Adult  Goal: Free from fall injury  Outcome: Progressing  Flowsheets (Taken 12/6/2024 0649 by Mahendra Kennedy, RN)  Free From Fall Injury: Instruct family/caregiver on patient safety  Note: Encourage patient to call using call bell whenever help is needed.     Problem: Skin/Tissue Integrity - Adult  Goal: Skin integrity remains intact  Outcome: Progressing  Flowsheets (Taken 12/6/2024 0740 by Jenni Chery GN)  Skin Integrity Remains Intact: Monitor for areas of redness and/or skin breakdown  Note: Monitor patient's skin for new redness or rashes.     Problem: Musculoskeletal - Adult  Goal: Return mobility to safest level of function  Outcome: Progressing  Flowsheets (Taken 12/6/2024 0740 by Jenni Chery GN)  Return Mobility to Safest Level of Function:   Assess patient stability and activity tolerance for standing, transferring and ambulating with or without assistive devices   Obtain physical therapy/occupational therapy consults as needed  Note: Encourage patient to participate      Problem: Gastrointestinal - Adult  Goal: Minimal or absence of nausea and vomiting  Outcome: Progressing  Flowsheets (Taken 12/6/2024 0740 by Jenni Chery GN)  Minimal or absence of nausea and vomiting:   Administer IV fluids as ordered to ensure adequate hydration   Maintain NPO status until nausea and vomiting are resolved  Note: Administer antiemetics as ordered.  Goal: Maintains or returns to baseline bowel function  Outcome:

## 2024-12-30 ENCOUNTER — OFFICE VISIT (OUTPATIENT)
Age: 34
End: 2024-12-30

## 2024-12-30 VITALS
RESPIRATION RATE: 16 BRPM | SYSTOLIC BLOOD PRESSURE: 130 MMHG | OXYGEN SATURATION: 97 % | HEIGHT: 65 IN | TEMPERATURE: 97.2 F | BODY MASS INDEX: 35.94 KG/M2 | DIASTOLIC BLOOD PRESSURE: 80 MMHG | HEART RATE: 50 BPM

## 2024-12-30 DIAGNOSIS — K81.0 ACUTE CHOLECYSTITIS: Primary | ICD-10-CM

## 2024-12-30 DIAGNOSIS — Z90.49 S/P LAPAROSCOPIC CHOLECYSTECTOMY: ICD-10-CM

## 2024-12-30 PROCEDURE — 99024 POSTOP FOLLOW-UP VISIT: CPT

## 2024-12-30 NOTE — PROGRESS NOTES
Stacia Arita is a 34 y.o. female (: 1990)     Chief Complaint   Patient presents with    Post-Op Check     Cholecystectomy        Medication list and allergies have been reviewed with Stacia Arita and updated as of today's date.     I have gone over all Medical, Surgical and Social History with Stacia Arita and updated/added the information accordingly.     1. Have you been to the ER, urgent care clinic since your last visit?  Hospitalized since your last visit?No    2. Have you seen or consulted any other health care providers outside of the [facility] since your last visit?  Include any pap smears or colon screening. No      
https://www.kidney.org/professionals/kdoqi/gfr_calculatorped     These results are not intended for use in patients <18 years of age.     eGFR results are calculated without a race factor using  the 2021 CKD-EPI equation. Careful clinical correlation is recommended, particularly when comparing to results calculated using previous equations.  The CKD-EPI equation is less accurate in patients with extremes of muscle mass, extra-renal metabolism of creatinine, excessive creatine ingestion, or following therapy that affects renal tubular secretion.      Calcium 12/06/2024 9.1  8.5 - 10.1 MG/DL Final    Total Bilirubin 12/06/2024 0.6  0.2 - 1.0 MG/DL Final    ALT 12/06/2024 15  13 - 56 U/L Final    AST 12/06/2024 16  10 - 38 U/L Final    Alk Phosphatase 12/06/2024 109  45 - 117 U/L Final    Total Protein 12/06/2024 7.9  6.4 - 8.2 g/dL Final    Albumin 12/06/2024 3.5  3.4 - 5.0 g/dL Final    Globulin 12/06/2024 4.4 (H)  2.0 - 4.0 g/dL Final    Albumin/Globulin Ratio 12/06/2024 0.8  0.8 - 1.7   Final    Lipase 12/06/2024 32  13 - 75 U/L Final    Color, UA 12/06/2024 YELLOW    Final    Appearance 12/06/2024 CLEAR    Final    Specific Gravity, UA 12/06/2024 1.022  1.005 - 1.030   Final    pH, Urine 12/06/2024 5.5  5.0 - 8.0   Final    Protein, UA 12/06/2024 TRACE (A)  NEG mg/dL Final    Glucose, Ur 12/06/2024 Negative  NEG mg/dL Final    Ketones, Urine 12/06/2024 40 (A)  NEG mg/dL Final    Bilirubin, Urine 12/06/2024 Negative  NEG   Final    Blood, Urine 12/06/2024 SMALL (A)  NEG   Final    Urobilinogen, Urine 12/06/2024 1.0  0.2 - 1.0 EU/dL Final    Nitrite, Urine 12/06/2024 Negative  NEG   Final    Leukocyte Esterase, Urine 12/06/2024 Negative  NEG   Final    Special Requests 12/06/2024 NO SPECIAL REQUESTS    Final    Culture 12/06/2024 No growth (<1,000 CFU/ML)    Final    Special Requests 12/06/2024 NO SPECIAL REQUESTS    Final    Culture 12/06/2024 NO GROWTH 6 DAYS    Final    Special Requests 12/06/2024 NO SPECIAL

## 2025-01-26 ENCOUNTER — APPOINTMENT (OUTPATIENT)
Facility: HOSPITAL | Age: 35
End: 2025-01-26
Payer: MEDICARE

## 2025-01-26 ENCOUNTER — HOSPITAL ENCOUNTER (EMERGENCY)
Facility: HOSPITAL | Age: 35
Discharge: HOME | End: 2025-01-26
Payer: MEDICARE

## 2025-01-26 VITALS
DIASTOLIC BLOOD PRESSURE: 72 MMHG | TEMPERATURE: 99.9 F | RESPIRATION RATE: 16 BRPM | WEIGHT: 200 LBS | BODY MASS INDEX: 33.32 KG/M2 | HEART RATE: 105 BPM | HEIGHT: 65 IN | OXYGEN SATURATION: 98 % | SYSTOLIC BLOOD PRESSURE: 107 MMHG

## 2025-01-26 DIAGNOSIS — J10.1 INFLUENZA A: Primary | ICD-10-CM

## 2025-01-26 LAB
FLUAV RNA SPEC QL NAA+PROBE: DETECTED
FLUBV RNA SPEC QL NAA+PROBE: NOT DETECTED
SARS-COV-2 RNA RESP QL NAA+PROBE: NOT DETECTED
SOURCE: ABNORMAL

## 2025-01-26 PROCEDURE — 99284 EMERGENCY DEPT VISIT MOD MDM: CPT

## 2025-01-26 PROCEDURE — 71046 X-RAY EXAM CHEST 2 VIEWS: CPT

## 2025-01-26 PROCEDURE — 87636 SARSCOV2 & INF A&B AMP PRB: CPT

## 2025-01-26 RX ORDER — ALBUTEROL SULFATE 2.5 MG/.5ML
2.5 SOLUTION RESPIRATORY (INHALATION) EVERY 6 HOURS PRN
Qty: 20 ML | Refills: 0 | Status: SHIPPED | OUTPATIENT
Start: 2025-01-26

## 2025-01-26 NOTE — ED PROVIDER NOTES
EMERGENCY DEPARTMENT HISTORY AND PHYSICAL EXAM      Date: 1/26/2025  Patient Name: Stacia Arita    History of Presenting Illness     Chief Complaint   Patient presents with    flu-like symptoms    Asthma       History (Context): Stacia Arita is a 34 y.o. female with significant past medical history of asthma, sleep apnea presents ambulatory to the ED today.  Patient reports productive cough, congestion and intermittent wheezing that began yesterday. Patient reports using DuoNeb at home with relief of symptoms.  Denies any exposure to COVID or the flu      PCP: Wili Harris MD    No current facility-administered medications for this encounter.     Current Outpatient Medications   Medication Sig Dispense Refill    albuterol (PROVENTIL) 2.5 MG/0.5ML NEBU nebulizer solution Take 0.5 mLs by nebulization every 6 hours as needed for Wheezing 20 mL 0    baclofen (LIORESAL) 10 MG tablet Take 1 tablet by mouth at bedtime      ondansetron (ZOFRAN-ODT) 4 MG disintegrating tablet Take 1 tablet by mouth 3 times daily as needed for Nausea or Vomiting 21 tablet 0    amphetamine-dextroamphetamine (ADDERALL) 20 MG tablet Take 1 tablet by mouth 2 times daily.      aspirin 325 MG tablet Take 325 mg by mouth daily (Patient not taking: Reported on 12/6/2024)      cyclobenzaprine (FLEXERIL) 5 MG tablet Take 10 mg by mouth 3 times daily (with meals) (Patient not taking: Reported on 12/6/2024)      ergocalciferol (ERGOCALCIFEROL) 1.25 MG (12709 UT) capsule Take 1 capsule by mouth every 7 days      guaiFENesin-codeine (TUSSI-ORGANIDIN NR) 100-10 MG/5ML syrup Take 5 mLs by mouth 4 times daily as needed. (Patient not taking: Reported on 12/6/2024)      naproxen (NAPROSYN) 500 MG tablet Take 500 mg by mouth 2 times daily (with meals) (Patient not taking: Reported on 12/6/2024)      nirmatrelvir/ritonavir 300/100 (PAXLOVID) 20 x 150 MG & 10 x 100MG TBPK Take 300 mg nirmatrelvir and 100 mg ritonavir by mouth twice daily for 5

## 2025-06-11 ENCOUNTER — HOSPITAL ENCOUNTER (OUTPATIENT)
Facility: HOSPITAL | Age: 35
Setting detail: RECURRING SERIES
Discharge: HOME | End: 2025-06-14
Payer: MEDICARE

## 2025-06-11 PROCEDURE — 97163 PT EVAL HIGH COMPLEX 45 MIN: CPT

## 2025-06-11 PROCEDURE — 97535 SELF CARE MNGMENT TRAINING: CPT

## 2025-06-11 NOTE — PROGRESS NOTES
PT DAILY TREATMENT NOTE/NEURO EVAL     Patient Name: Stacia Arita    Date: 2025    : 1990  Insurance: Payor: MEDICARE / Plan: MEDICARE PART A AND B / Product Type: *No Product type* /      Patient  verified yes     Visit #   Current / Total 1 8-24   Time   In / Out 9:22 10:00   Pain   In / Out 0/10 0/10   Subjective Functional Status/Changes: See below     Treatment Area: Multiple sclerosis (HCC) [G35]    SUBJECTIVE    Current symptoms/Complaints: Patient was diagnosed with MS in 2018. She states she had a cholecystectomy in 2024 and was hospitalized for 2 days. She states this has caused her to lose strength. States she was able to walk with a rollator without assistance in her home prior to the surgery. States she has not been able to walk > 20 feet at home.  She reports no recent falls. She has a caregiver Monday - Friday from 7:00 - 3:00, 4:00-11:00, Sat and Sun  7:00 - 7:00 pm. She requires assistance with toileting, transfers, bathing, and dressing. She has a rollator, shower chair, grab bars.   She lives in a handicap accessible apartment. She is not able to stand at home due to fear of falling. States she would like to be able to stand to help with ADL's, such as brushing teeth and dressing.    She has an AFO on left foot.  She received a power WC last week. States she needs to contact the company because the seatbelt came off.     PMH: asthma, MS    OBJECTIVE/EXAMINATION  Domestic Life: see above  Activity/Recreational Limitations:   Mobility: [] No assistive device [] RW [x] Rollator [] Hemiwalker [] LBQC [] SBQC [] SPC [] Other:  Patient self reports she walks with rollator; no ambulation observed during eval   Self Care: see above        30 min [x]Eval     - untimed          Therapeutic Procedures:  Tx Min Billable or 1:1 Min (if diff from Tx Min) Procedure, Rationale, Specifics   8  34751 Self Care/Home Management (timed):  improve patient knowledge and understanding of home 
exercise program to maintain gains of therapy.  Status at evaluation: no HEP issued    2.   Goal: Patient will perform sit to stand transfer from WC with min A x 1 to improve independence with ADL's and stance.  Status at evaluation: max A x 1    3.   Goal: Patient will demonstrate stance with eyes open with B UE support on parallel bars with supervision to improve safety with ADL's.  Status at evaluation: min A from therapist and B UE support from parallel bars for stance with eyes open    4.  Goal: Patient will demonstrate ability to perform either stand pivot transfer with mod A x 1 or slide board transfer with min A x 1 to improve her ability to perform WC <> bed transfers.   Status at evaluation: unable to perform stand pivot transfer today due to poor hand placement despite verbal cues, impaired balance, and time constraints    Frequency / Duration: Patient would benefit from skilled PT 2 times per week for 24 VISITS sessions as needed in this certification period.  Goals will be assigned and reassessed every 10 visits/ 30 days per Medicare guidelines    Patient/ Caregiver education and instruction: Diagnosis, prognosis, self care [x]  Plan of care has been reviewed with PTA    Certification Period: 6/11/25 - 9/10/25    Yari Palomares, PT       6/11/2025       11:13 AM  ===================================================================  I certify that the above Therapy Services are being furnished while the patient is under my care. I agree with the treatment plan and certify that this therapy is necessary.    Physician's Signature:_________________________   DATE:_________   TIME:________                           Neli Levine DO    ** Signature, Date and Time must be completed for valid certification **  [facility] or you may fax the signed copy to [phone].  Thank you.

## 2025-06-17 ENCOUNTER — HOSPITAL ENCOUNTER (OUTPATIENT)
Facility: HOSPITAL | Age: 35
Setting detail: RECURRING SERIES
Discharge: HOME | End: 2025-06-20
Payer: MEDICARE

## 2025-06-17 PROCEDURE — 97110 THERAPEUTIC EXERCISES: CPT

## 2025-06-17 PROCEDURE — 97530 THERAPEUTIC ACTIVITIES: CPT

## 2025-06-17 PROCEDURE — 97112 NEUROMUSCULAR REEDUCATION: CPT

## 2025-06-17 NOTE — PROGRESS NOTES
SM: ivonne, fax received from [facility] Record). Next infusion date is 6/21/22. Sent to scanning.     Rachel Lujan RN
PLOF and address remaining functional goals. (see flow sheet as applicable)     Details if applicable:  LAQ, S/L clam, seated pallof press   55  [facility] Totals Reminder: bill using total billable min of TIMED therapeutic procedures (example: do not include dry needle or estim unattended, both untimed codes, in totals to left)  8-22 min = 1 unit; 23-37 min = 2 units; 38-52 min = 3 units; 53-67 min = 4 units; 68-82 min = 5 units   Total Total     [x]  Patient Education billed concurrently with other procedures   [x] Review HEP    [] Progressed/Changed HEP, detail:    [] Other detail:       Charge capture    Objective Information/Functional Measures/Assessment    Exercise progression per flowsheet.    Mod A with stand pivot transfers from WC <> mat.  Requires cueing for correct hand placement and positioning.     Patient unable to perform H/L march with left LE due to left extension synergy.     Patient able to roll with modified independence but requires mod A for scooting to center of mat utilizing bridge.     Required mod A with right S/L to sit transfer.     Patient will continue to benefit from skilled PT / OT services to modify and progress therapeutic interventions, analyze and address functional mobility deficits, analyze and address ROM deficits, analyze and address strength deficits, analyze and address soft tissue restrictions, analyze and cue for proper movement patterns, analyze and modify for postural abnormalities, analyze and address imbalance/dizziness, and instruct in home and community integration to address functional deficits and attain remaining goals.    Progress toward goals / Updated goals:  []  See Progress Note/Recertification    Short Term Goals: To be accomplished in 4-6 weeks  Goal: Patient will be compliant with home exercise program to decrease pain and improve function.  Status at evaluation: no HEP issued  Current status: issued HEP Progressing     2.   Goal: Patient will perform sit to

## 2025-06-19 ENCOUNTER — HOSPITAL ENCOUNTER (OUTPATIENT)
Facility: HOSPITAL | Age: 35
Setting detail: RECURRING SERIES
Discharge: HOME | End: 2025-06-22
Payer: MEDICARE

## 2025-06-19 PROCEDURE — 97112 NEUROMUSCULAR REEDUCATION: CPT

## 2025-06-19 PROCEDURE — 97530 THERAPEUTIC ACTIVITIES: CPT

## 2025-06-19 PROCEDURE — 97110 THERAPEUTIC EXERCISES: CPT

## 2025-06-19 NOTE — PROGRESS NOTES
[facility]   6/26/2025 10:00 AM Behrens, Laura M, PTA [facility]   7/1/2025 10:40 AM Yari Palomares, PT [facility]   7/2/2025 10:40 AM Juliana Haile, PT [facility]   7/7/2025 10:40 AM Behrens, Laura M, PTA [facility]   7/9/2025 10:00 AM Behrens, Laura M, PTA [facility]   7/14/2025 10:00 AM Behrens, Laura M, PTA [facility]   7/16/2025 10:00 AM Juliana Haile, PT [facility]   7/21/2025 10:40 AM Behrens, Laura M, PTA [facility]   7/23/2025 10:00 AM Juliana Haile, PT [facility]   7/28/2025 10:40 AM Behrens, Laura M, PTA [facility]   7/30/2025 10:00 AM Behrens, Laura M, PTA [facility]       If an interpreting service was utilized for treatment of this patient, the contents of this document represent the material reviewed with the patient via the .

## 2025-06-25 ENCOUNTER — APPOINTMENT (OUTPATIENT)
Facility: HOSPITAL | Age: 35
End: 2025-06-25
Payer: MEDICARE

## 2025-06-26 ENCOUNTER — HOSPITAL ENCOUNTER (OUTPATIENT)
Facility: HOSPITAL | Age: 35
Setting detail: RECURRING SERIES
Discharge: HOME | End: 2025-06-29
Payer: MEDICARE

## 2025-06-26 PROCEDURE — 97112 NEUROMUSCULAR REEDUCATION: CPT

## 2025-06-26 PROCEDURE — 97530 THERAPEUTIC ACTIVITIES: CPT

## 2025-06-26 PROCEDURE — 97110 THERAPEUTIC EXERCISES: CPT

## 2025-06-26 NOTE — PROGRESS NOTES
PHYSICAL THERAPY - DAILY TREATMENT NOTE (updated )    Patient Name: Stacia Arita    Date: 2025    : 1990  Insurance: Payor: MEDICARE / Plan: MEDICARE PART A AND B / Product Type: *No Product type* /      Patient  verified yes     Visit #   Current / Total 4 8-24   Time   In / Out 10:02 10:44   Pain   In / Out 0/10 0/10   Subjective Functional Status/Changes: Pt reports she went home and slept after last visit. She states difficulty finding balance. She uses her arms a lot to get out of chairs.      TREATMENT AREA =  Multiple sclerosis (HCC)    Next PN due: 25; RC: 9/10/25  Auth due: NUSRAT    OBJECTIVE    Therapeutic Procedures:  Tx Min Billable or 1:1 Min (if diff from Tx Min) Procedure, Rationale, Specifics   12  42721 Therapeutic Exercise (timed):  increase ROM, strength, coordination, balance, and proprioception to improve patient's ability to progress to PLOF and address remaining functional goals. (see flow sheet as applicable)     Details if applicable:      15  76549 Therapeutic Activity (timed):  use of dynamic activities replicating functional movements to increase ROM, strength, coordination, balance, and proprioception in order to improve patient's ability to progress to PLOF and address remaining functional goals.  (see flow sheet as applicable)     Details if applicable:  sit to stands   15  38034 Neuromuscular Re-Education (timed):  improve balance, coordination, kinesthetic sense, posture, core stability and proprioception to improve patient's ability to develop conscious control of individual muscles and awareness of position of extremities in order to progress to PLOF and address remaining functional goals. (see flow sheet as applicable)     Details if applicable:  standing balance; core   42  MC BC Totals Reminder: bill using total billable min of TIMED therapeutic procedures (example: do not include dry needle or estim unattended, both untimed codes, in totals to

## 2025-07-01 ENCOUNTER — HOSPITAL ENCOUNTER (OUTPATIENT)
Facility: HOSPITAL | Age: 35
Setting detail: RECURRING SERIES
Discharge: HOME | End: 2025-07-04
Payer: MEDICARE

## 2025-07-01 PROCEDURE — 97530 THERAPEUTIC ACTIVITIES: CPT

## 2025-07-01 PROCEDURE — 97112 NEUROMUSCULAR REEDUCATION: CPT

## 2025-07-01 PROCEDURE — 97110 THERAPEUTIC EXERCISES: CPT

## 2025-07-01 NOTE — PROGRESS NOTES
PHYSICAL THERAPY - DAILY TREATMENT NOTE (updated )    Patient Name: Stacia Arita    Date: 2025    : 1990  Insurance: Payor: MEDICARE / Plan: MEDICARE PART A AND B / Product Type: *No Product type* /      Patient  verified yes     Visit #   Current / Total 5 8-24   Time   In / Out 10:39 11:20   Pain   In / Out 0/10 0/10   Subjective Functional Status/Changes: \"This heat is bad for me. I couldn't go to a cookout with my family because it was too hot. I just can't handle the heat.\"     TREATMENT AREA =  Multiple sclerosis (HCC)    Next PN due: 25; RC: 9/10/25  Auth due: NUSRAT    OBJECTIVE    Therapeutic Procedures:  Tx Min Billable or 1:1 Min (if diff from Tx Min) Procedure, Rationale, Specifics   10  37920 Therapeutic Exercise (timed):  increase ROM, strength, coordination, balance, and proprioception to improve patient's ability to progress to PLOF and address remaining functional goals. (see flow sheet as applicable)     Details if applicable: tband rows/ext     20  79082 Therapeutic Activity (timed):  use of dynamic activities replicating functional movements to increase ROM, strength, coordination, balance, and proprioception in order to improve patient's ability to progress to PLOF and address remaining functional goals.  (see flow sheet as applicable)     Details if applicable:  sit to stands, stand   36543 Neuromuscular Re-Education (timed):  improve balance, coordination, kinesthetic sense, posture, core stability and proprioception to improve patient's ability to develop conscious control of individual muscles and awareness of position of extremities in order to progress to PLOF and address remaining functional goals. (see flow sheet as applicable)     Details if applicable:  pallof press, stance with EO    41  MC BC Totals Reminder: bill using total billable min of TIMED therapeutic procedures (example: do not include dry needle or estim unattended, both untimed codes, in

## 2025-07-02 ENCOUNTER — HOSPITAL ENCOUNTER (OUTPATIENT)
Facility: HOSPITAL | Age: 35
Setting detail: RECURRING SERIES
Discharge: HOME | End: 2025-07-05
Payer: MEDICARE

## 2025-07-02 PROCEDURE — 97530 THERAPEUTIC ACTIVITIES: CPT

## 2025-07-02 PROCEDURE — 97110 THERAPEUTIC EXERCISES: CPT

## 2025-07-02 PROCEDURE — 97112 NEUROMUSCULAR REEDUCATION: CPT

## 2025-07-02 NOTE — PROGRESS NOTES
PHYSICAL / OCCUPATIONAL THERAPY - DAILY TREATMENT NOTE    Patient Name: Stacia Arita    Date: 2025    : 1990  Insurance: Payor: MEDICARE / Plan: MEDICARE PART A AND B / Product Type: *No Product type* /      Patient  verified Yes     Visit #   Current / Total 6 8-24   Time   In / Out 1042 1120   Pain   In / Out 0 0   Subjective Functional Status/Changes: \"I don't go out in the heat\" Gets infusions 2 times a yr. No new  lesions. She has a Rolator in the house. She just got a motorized chair worth $35,000.     TREATMENT AREA =  Multiple sclerosis (HCC)    OBJECTIVE         Therapeutic Procedures:    Tx Min Billable or 1:1 Min (if diff from Tx Min) Procedure, Rationale, Specifics   18  67618 Therapeutic Exercise (timed):  increase ROM, strength, coordination, balance, and proprioception to improve patient's ability to progress to PLOF and address remaining functional goals. (see flow sheet as applicable)     Details if applicable:       10  56427 Neuromuscular Re-Education (timed):  improve balance, coordination, kinesthetic sense, posture, core stability and proprioception to improve patient's ability to develop conscious control of individual muscles and awareness of position of extremities in order to progress to PLOF and address remaining functional goals. (see flow sheet as applicable)     Details if applicable:     10  05138 Therapeutic Activity (timed):  use of dynamic activities replicating functional movements to increase ROM, strength, coordination, balance, and proprioception in order to improve patient's ability to progress to PLOF and address remaining functional goals.  (see flow sheet as applicable)     Details if applicable:            Details if applicable:            Details if applicable:     38  [facility] Totals Reminder: bill using total billable min of TIMED therapeutic procedures (example: do not include dry needle or estim unattended, both untimed codes, in totals to left)  8

## 2025-07-07 ENCOUNTER — HOSPITAL ENCOUNTER (OUTPATIENT)
Facility: HOSPITAL | Age: 35
Setting detail: RECURRING SERIES
End: 2025-07-07
Payer: MEDICARE

## 2025-07-09 ENCOUNTER — HOSPITAL ENCOUNTER (OUTPATIENT)
Facility: HOSPITAL | Age: 35
Setting detail: RECURRING SERIES
Discharge: HOME | End: 2025-07-12
Payer: MEDICARE

## 2025-07-09 PROCEDURE — 97530 THERAPEUTIC ACTIVITIES: CPT

## 2025-07-09 PROCEDURE — 97110 THERAPEUTIC EXERCISES: CPT

## 2025-07-09 PROCEDURE — 97112 NEUROMUSCULAR REEDUCATION: CPT

## 2025-07-09 NOTE — THERAPY RECERTIFICATION
[facility]  [address] [place] [address]  [phone] fax    Continued Plan of Care/ Re-certification for Physical Therapy Services    Patient Name: Stacia Arita : 1990   Medical   Diagnosis: Multiple sclerosis (HCC) [G35]  Generalized weakness [R53.1]  Abnormal gait [R26.9] Treatment Diagnosis: R26.89  Abnormalities of gait and mobility and M62.81  GENERAL MUSCLE WEAKNESS      Onset Date: Declined function in 2024, MS diagnosis in 2018 Payor :  Payor: MEDICARE / Plan: MEDICARE PART A AND B / Product Type: *No Product type* /    Referral Source: Neli Levine DO Start of Care (SOC): 2025   Prior Hospitalization: See medical history Provider #: [id number]   Prior Level of Function: Ambulating and standing with rollator prior to surgery in 2024   Comorbidities:  asthma, MS, cholecystectomy in 2024, narcolepsy     Visits from Start of Care: 7    Missed Visits: 1    The Plan of Care and following information is based on the patient's current status:  Goal: Patient will be compliant with home exercise program to decrease pain and improve function.  Status at evaluation: no HEP issued  Current Status: met    Goal: Patient will perform sit to stand transfer from  with mod A x 1 to improve independence with ADL's and stance.  Status at evaluation: max A x 1  Current Status: met    Goal: Patient will demonstrate stance with eyes open with B UE support on parallel bars with CG to improve safety with ADL's.  Status at evaluation: min A from therapist and B UE support from parallel bars for stance with eyes open  Current Status: met    Goal: Patient will be independent with home exercise program to maintain gains of therapy.  Status at evaluation: no HEP issued  Current Status: met    Goal: Patient will perform sit to stand transfer from  with min A x 1 to improve independence with ADL's and stance.  Status at evaluation: max A x

## 2025-07-09 NOTE — PROGRESS NOTES
PHYSICAL / OCCUPATIONAL THERAPY - DAILY TREATMENT NOTE    Patient Name: Stacia Arita    Date: 2025    : 1990  Insurance: Payor: [employer]IC[employer] / Plan: [facility] COMPLETE HMO / Product Type: *No Product type* /      Patient  verified Yes     Visit #   Current / Total 7 24   Time   In / Out 10:00 10:39   Pain   In / Out 0/10 0/10   Subjective Functional Status/Changes: Pt. Reports she is doing good today. She reports having good and bad days.      TREATMENT AREA =  Multiple sclerosis (HCC)    OBJECTIVE    Therapeutic Procedures:    Tx Min Billable or 1:1 Min (if diff from Tx Min) Procedure, Rationale, Specifics   9  95997 Therapeutic Exercise (timed):  increase ROM, strength, coordination, balance, and proprioception to improve patient's ability to progress to PLOF and address remaining functional goals. (see flow sheet as applicable)     Details if applicable:  see flow sheet     8  10535 Neuromuscular Re-Education (timed):  improve balance, coordination, kinesthetic sense, posture, core stability and proprioception to improve patient's ability to develop conscious control of individual muscles and awareness of position of extremities in order to progress to PLOF and address remaining functional goals. (see flow sheet as applicable)     Details if applicable:  seated reaching and perturbations. Standing balance    22  24270 Therapeutic Activity (timed):  use of dynamic activities replicating functional movements to increase ROM, strength, coordination, balance, and proprioception in order to improve patient's ability to progress to PLOF and address remaining functional goals.  (see flow sheet as applicable)     Details if applicable:  transfers, rolling, ambulation with RW          Details if applicable:            Details if applicable:     39  [facility] Totals Reminder: bill using total billable min of TIMED therapeutic procedures (example: do not include dry needle or estim

## 2025-07-16 ENCOUNTER — APPOINTMENT (OUTPATIENT)
Facility: HOSPITAL | Age: 35
End: 2025-07-16
Payer: MEDICARE

## 2025-07-21 ENCOUNTER — HOSPITAL ENCOUNTER (OUTPATIENT)
Facility: HOSPITAL | Age: 35
Setting detail: RECURRING SERIES
Discharge: HOME | End: 2025-07-24
Payer: MEDICARE

## 2025-07-21 PROCEDURE — 97112 NEUROMUSCULAR REEDUCATION: CPT

## 2025-07-21 PROCEDURE — 97110 THERAPEUTIC EXERCISES: CPT

## 2025-07-21 PROCEDURE — 97535 SELF CARE MNGMENT TRAINING: CPT

## 2025-07-21 NOTE — PROGRESS NOTES
PHYSICAL / OCCUPATIONAL THERAPY - DAILY TREATMENT NOTE    Patient Name: Stacia Arita    Date: 2025    : 1990  Insurance: Payor: [employer] / Plan: [facility] COMPLETE HMO / Product Type: *No Product type* /      Patient  verified Yes     Visit #   Current / Total 1 16   Time   In / Out 10:41 11:20   Pain   In / Out 0/10 0/10   Subjective Functional Status/Changes: Pt reports she takes a medicine daily to help her walk better for MS but also gets a shot every 6 months and is due again in August. She would like to be able to walk with a quad cane again. She uses the rollator even though the brakes are broke because she likes the pouch to carry things.      TREATMENT AREA =  Multiple sclerosis (HCC)    OBJECTIVE    Therapeutic Procedures:    Tx Min Billable or 1:1 Min (if diff from Tx Min) Procedure, Rationale, Specifics   14  48784 Therapeutic Exercise (timed):  increase ROM, strength, coordination, balance, and proprioception to improve patient's ability to progress to PLOF and address remaining functional goals. (see flow sheet as applicable)     Details if applicable:  see flow sheet     15  23707 Neuromuscular Re-Education (timed):  improve balance, coordination, kinesthetic sense, posture, core stability and proprioception to improve patient's ability to develop conscious control of individual muscles and awareness of position of extremities in order to progress to PLOF and address remaining functional goals. (see flow sheet as applicable)     Details if applicable:  standing balance; weight shifts   10  74632 Self Care/Home Management (timed):  improve patient knowledge and understanding of home injury/symptom/pain management, positioning, posture/ergonomics, home safety, activity modification, transfer techniques, and joint protection strategies  to improve patient's ability to progress to PLOF and address remaining functional goals.  (see flow sheet as applicable)

## 2025-07-23 ENCOUNTER — HOSPITAL ENCOUNTER (OUTPATIENT)
Facility: HOSPITAL | Age: 35
Setting detail: RECURRING SERIES
Discharge: HOME | End: 2025-07-26
Payer: MEDICARE

## 2025-07-23 PROCEDURE — 97110 THERAPEUTIC EXERCISES: CPT

## 2025-07-23 PROCEDURE — 97530 THERAPEUTIC ACTIVITIES: CPT

## 2025-07-23 PROCEDURE — 97112 NEUROMUSCULAR REEDUCATION: CPT

## 2025-07-23 NOTE — PROGRESS NOTES
PHYSICAL / OCCUPATIONAL THERAPY - DAILY TREATMENT NOTE    Patient Name: Stacia Arita    Date: 2025    : 1990  Insurance: Payor: [employer] / Plan: [facility] COMPLETE HMO / Product Type: *No Product type* /      Patient  verified Yes     Visit #   Current / Total 2 16   Time   In / Out 1005 1043   Pain   In / Out 0 0   Subjective Functional Status/Changes: Reports she is waiting on a seatbelt for her new electric chair.      TREATMENT AREA =  Multiple sclerosis (HCC)    OBJECTIVE         Therapeutic Procedures:    Tx Min Billable or 1:1 Min (if diff from Tx Min) Procedure, Rationale, Specifics     09414 Therapeutic Exercise (timed):  increase ROM, strength, coordination, balance, and proprioception to improve patient's ability to progress to PLOF and address remaining functional goals. (see flow sheet as applicable)     Details if applicable:       14  33471 Neuromuscular Re-Education (timed):  improve balance, coordination, kinesthetic sense, posture, core stability and proprioception to improve patient's ability to develop conscious control of individual muscles and awareness of position of extremities in order to progress to PLOF and address remaining functional goals. (see flow sheet as applicable)     Details if applicable:     9  78888 Therapeutic Activity (timed):  use of dynamic activities replicating functional movements to increase ROM, strength, coordination, balance, and proprioception in order to improve patient's ability to progress to PLOF and address remaining functional goals.  (see flow sheet as applicable)     Details if applicable:     15  42647 Gait Training (timed):    58 feet with RW (assistive device) over carpeted  surfaces with CGA level of assist. Cuing for picking up left foot clearance.  To improve safety and dynamic movement with household/community ambulation.  (see flow sheet as applicable)     Details if applicable:  amb in parallel bars forward

## 2025-07-28 ENCOUNTER — HOSPITAL ENCOUNTER (OUTPATIENT)
Facility: HOSPITAL | Age: 35
Setting detail: RECURRING SERIES
Discharge: HOME | End: 2025-07-31
Payer: MEDICARE

## 2025-07-28 PROCEDURE — 97112 NEUROMUSCULAR REEDUCATION: CPT

## 2025-07-28 PROCEDURE — 97530 THERAPEUTIC ACTIVITIES: CPT

## 2025-07-28 PROCEDURE — 97110 THERAPEUTIC EXERCISES: CPT

## 2025-07-28 NOTE — PROGRESS NOTES
PHYSICAL / OCCUPATIONAL THERAPY - DAILY TREATMENT NOTE    Patient Name: Stacia Arita    Date: 2025    : 1990  Insurance: Payor: [employer] / Plan: [facility] COMPLETE HMO / Product Type: *No Product type* /      Patient  verified Yes     Visit #   Current / Total 3 16   Time   In / Out 1045 1125   Pain   In / Out 0 0   Subjective Functional Status/Changes: Reports she walked the furthest the last visit. She want to be able to balance better and stand up as well as walk further.      TREATMENT AREA =  Multiple sclerosis (HCC)    OBJECTIVE         Therapeutic Procedures:    Tx Min Billable or 1:1 Min (if diff from Tx Min) Procedure, Rationale, Specifics   10  16898 Therapeutic Exercise (timed):  increase ROM, strength, coordination, balance, and proprioception to improve patient's ability to progress to PLOF and address remaining functional goals. (see flow sheet as applicable)     Details if applicable:       20  18685 Neuromuscular Re-Education (timed):  improve balance, coordination, kinesthetic sense, posture, core stability and proprioception to improve patient's ability to develop conscious control of individual muscles and awareness of position of extremities in order to progress to PLOF and address remaining functional goals. (see flow sheet as applicable)     Details if applicable:     10  24817 Therapeutic Activity (timed):  use of dynamic activities replicating functional movements to increase ROM, strength, coordination, balance, and proprioception in order to improve patient's ability to progress to PLOF and address remaining functional goals.  (see flow sheet as applicable)     Details if applicable:     40  [facility] BC Totals Reminder: bill using total billable min of TIMED therapeutic procedures (example: do not include dry needle or estim unattended, both untimed codes, in totals to left)  8-22 min = 1 unit; 23-37 min = 2 units; 38-52 min = 3 units; 53-67 min = 4 units;

## 2025-07-30 ENCOUNTER — HOSPITAL ENCOUNTER (OUTPATIENT)
Facility: HOSPITAL | Age: 35
Setting detail: RECURRING SERIES
Discharge: HOME | End: 2025-08-02
Payer: MEDICARE

## 2025-07-30 PROCEDURE — 97116 GAIT TRAINING THERAPY: CPT

## 2025-07-30 PROCEDURE — 97110 THERAPEUTIC EXERCISES: CPT

## 2025-07-30 PROCEDURE — 97112 NEUROMUSCULAR REEDUCATION: CPT

## 2025-07-30 NOTE — PROGRESS NOTES
PHYSICAL / OCCUPATIONAL THERAPY - DAILY TREATMENT NOTE    Patient Name: Stacia Arita    Date: 2025    : 1990  Insurance: Payor: [employer] / [place] [facility] [place] / Product Type: *No Product type* /      Patient  verified Yes     Visit #   Current / Total 4 16   Time   In / Out 10:00 10:46   Pain   In / Out 0/10 0/10   Subjective Functional Status/Changes: Pt reports no current pain. She states she can tell she is ready for her infusion because she feels the weakness increasing. She notes difficulty moving the left leg forward. She doesn't have a follow up right now with neurologist for next brain MRI. She doesn't know what type of MS she has but thinks it may be progressive; she has noticed a plateau in worsening since doing the injections.      TREATMENT AREA =  Multiple sclerosis (HCC)    OBJECTIVE         Therapeutic Procedures:    Tx Min Billable or 1:1 Min (if diff from Tx Min) Procedure, Rationale, Specifics   19  34385 Therapeutic Exercise (timed):  increase ROM, strength, coordination, balance, and proprioception to improve patient's ability to progress to PLOF and address remaining functional goals. (see flow sheet as applicable)     Details if applicable:  see flow sheet     15  82015 Neuromuscular Re-Education (timed):  improve balance, coordination, kinesthetic sense, posture, core stability and proprioception to improve patient's ability to develop conscious control of individual muscles and awareness of position of extremities in order to progress to PLOF and address remaining functional goals. (see flow sheet as applicable)     Details if applicable:  core re-ed   12  22358 Gait Training (timed):    85 feet with RW (assistive device) over level surfaces with CGA level of assist. Cuing for left LE advancement.  To improve safety and dynamic movement with household/community ambulation.  (see flow sheet as applicable)     Details if applicable:     46  MC

## 2025-08-06 ENCOUNTER — HOSPITAL ENCOUNTER (OUTPATIENT)
Facility: HOSPITAL | Age: 35
Setting detail: RECURRING SERIES
Discharge: HOME | End: 2025-08-09
Payer: MEDICARE

## 2025-08-06 PROCEDURE — 97110 THERAPEUTIC EXERCISES: CPT

## 2025-08-06 PROCEDURE — 97530 THERAPEUTIC ACTIVITIES: CPT

## 2025-08-06 PROCEDURE — 97112 NEUROMUSCULAR REEDUCATION: CPT

## 2025-08-11 ENCOUNTER — HOSPITAL ENCOUNTER (OUTPATIENT)
Facility: HOSPITAL | Age: 35
Setting detail: RECURRING SERIES
Discharge: HOME | End: 2025-08-14
Payer: MEDICARE

## 2025-08-11 PROCEDURE — 97110 THERAPEUTIC EXERCISES: CPT

## 2025-08-11 PROCEDURE — 97112 NEUROMUSCULAR REEDUCATION: CPT

## 2025-08-11 PROCEDURE — 97116 GAIT TRAINING THERAPY: CPT

## 2025-08-13 ENCOUNTER — HOSPITAL ENCOUNTER (OUTPATIENT)
Facility: HOSPITAL | Age: 35
Setting detail: RECURRING SERIES
Discharge: HOME | End: 2025-08-16
Payer: MEDICARE

## 2025-08-13 PROCEDURE — 97112 NEUROMUSCULAR REEDUCATION: CPT

## 2025-08-13 PROCEDURE — 97530 THERAPEUTIC ACTIVITIES: CPT

## 2025-08-13 PROCEDURE — 97110 THERAPEUTIC EXERCISES: CPT

## 2025-08-18 ENCOUNTER — HOSPITAL ENCOUNTER (OUTPATIENT)
Facility: HOSPITAL | Age: 35
Setting detail: RECURRING SERIES
Discharge: HOME | End: 2025-08-21
Payer: MEDICARE

## 2025-08-18 PROCEDURE — 97110 THERAPEUTIC EXERCISES: CPT

## 2025-08-18 PROCEDURE — 97112 NEUROMUSCULAR REEDUCATION: CPT

## 2025-08-18 PROCEDURE — 97530 THERAPEUTIC ACTIVITIES: CPT

## 2025-08-20 ENCOUNTER — APPOINTMENT (OUTPATIENT)
Facility: HOSPITAL | Age: 35
End: 2025-08-20
Payer: MEDICARE

## 2025-08-20 ENCOUNTER — HOSPITAL ENCOUNTER (OUTPATIENT)
Facility: HOSPITAL | Age: 35
Setting detail: RECURRING SERIES
Discharge: HOME | End: 2025-08-23
Payer: MEDICARE

## 2025-08-20 PROCEDURE — 97112 NEUROMUSCULAR REEDUCATION: CPT

## 2025-08-20 PROCEDURE — 97110 THERAPEUTIC EXERCISES: CPT

## 2025-08-20 PROCEDURE — 97530 THERAPEUTIC ACTIVITIES: CPT

## 2025-08-25 ENCOUNTER — HOSPITAL ENCOUNTER (OUTPATIENT)
Facility: HOSPITAL | Age: 35
Setting detail: RECURRING SERIES
Discharge: HOME | End: 2025-08-28
Payer: MEDICARE

## 2025-08-25 PROCEDURE — 97110 THERAPEUTIC EXERCISES: CPT

## 2025-08-25 PROCEDURE — 97530 THERAPEUTIC ACTIVITIES: CPT

## 2025-08-25 PROCEDURE — 97112 NEUROMUSCULAR REEDUCATION: CPT

## 2025-08-27 ENCOUNTER — HOSPITAL ENCOUNTER (OUTPATIENT)
Facility: HOSPITAL | Age: 35
Setting detail: RECURRING SERIES
Discharge: HOME | End: 2025-08-30
Payer: MEDICARE

## 2025-08-27 PROCEDURE — 97112 NEUROMUSCULAR REEDUCATION: CPT

## 2025-08-27 PROCEDURE — 97110 THERAPEUTIC EXERCISES: CPT

## 2025-08-27 PROCEDURE — 97530 THERAPEUTIC ACTIVITIES: CPT

## (undated) DEVICE — STRIP SKIN CLSR W1XL5IN NYLON REINF CURAD STERIL

## (undated) DEVICE — APPLIER CLP M L L11.4IN DIA10MM ENDOSCP ROT MULT FOR LIG

## (undated) DEVICE — [HIGH FLOW INSUFFLATOR,  DO NOT USE IF PACKAGE IS DAMAGED,  KEEP DRY,  KEEP AWAY FROM SUNLIGHT,  PROTECT FROM HEAT AND RADIOACTIVE SOURCES.]: Brand: PNEUMOSURE

## (undated) DEVICE — TISSUE RETRIEVAL SYSTEM: Brand: INZII RETRIEVAL SYSTEM

## (undated) DEVICE — GLOVE SURG SZ 6 CRM LTX FREE POLYISOPRENE POLYMER BEAD ANTI

## (undated) DEVICE — GLOVE SURG SZ 65 L12IN FNGR THK79MIL GRN LTX FREE

## (undated) DEVICE — SUTURE VICRYL + SZ 0 L27IN ABSRB VLT L26MM UR-6 5/8 CIR VCP603H

## (undated) DEVICE — Device

## (undated) DEVICE — TROCAR: Brand: KII® OPTICAL ACCESS SYSTEM

## (undated) DEVICE — CLEAN UP KIT: Brand: MEDLINE INDUSTRIES, INC.

## (undated) DEVICE — MASTISOL ADHESIVE LIQ 2/3ML

## (undated) DEVICE — SYRINGE MED 10ML LUERLOCK TIP W/O SFTY DISP

## (undated) DEVICE — LAPAROSCOPIC TROCAR SLEEVE/SINGLE USE: Brand: KII® OPTICAL ACCESS SYSTEM

## (undated) DEVICE — NEEDLE INSUFFLATION 14 GAX120 MM SURGINEEDLE

## (undated) DEVICE — ELECTRODE PT RET AD L9FT HI MOIST COND ADH HYDRGEL CORDED

## (undated) DEVICE — 2, DISPOSABLE SUCTION/IRRIGATOR WITH DISPOSABLE TIP: Brand: STRYKEFLOW

## (undated) DEVICE — SCISSORS ENDOSCP DIA5MM CRV MPLR CAUT W/ RATCH HNDL

## (undated) DEVICE — TROCAR: Brand: KII® SLEEVE

## (undated) DEVICE — SUTURE MONOCRYL SZ 4-0 L27IN ABSRB UD L24MM PS-1 3/8 CIR PRIM Y935H

## (undated) DEVICE — DRAPE TOWEL: Brand: CONVERTORS

## (undated) DEVICE — ELECTRODE ES 36CM LAP FLAT L HK COAT DISP CLEANCOAT

## (undated) DEVICE — BLADE,STAINLESS-STEEL,11,STRL,DISPOSABLE: Brand: MEDLINE

## (undated) DEVICE — STRIP,CLOSURE,WOUND,MEDI-STRIP,1/2X4: Brand: MEDLINE

## (undated) DEVICE — APPLICATOR MEDICATED 26 CC SOLUTION HI LT ORNG CHLORAPREP